# Patient Record
Sex: FEMALE | Race: WHITE | NOT HISPANIC OR LATINO | Employment: PART TIME | URBAN - METROPOLITAN AREA
[De-identification: names, ages, dates, MRNs, and addresses within clinical notes are randomized per-mention and may not be internally consistent; named-entity substitution may affect disease eponyms.]

---

## 2017-11-01 ENCOUNTER — APPOINTMENT (OUTPATIENT)
Dept: CT IMAGING | Facility: HOSPITAL | Age: 37
End: 2017-11-01

## 2017-11-01 ENCOUNTER — HOSPITAL ENCOUNTER (EMERGENCY)
Facility: HOSPITAL | Age: 37
Discharge: HOME | End: 2017-11-01
Attending: FAMILY MEDICINE | Admitting: FAMILY MEDICINE

## 2017-11-01 VITALS
RESPIRATION RATE: 18 BRPM | SYSTOLIC BLOOD PRESSURE: 96 MMHG | DIASTOLIC BLOOD PRESSURE: 66 MMHG | BODY MASS INDEX: 33.32 KG/M2 | HEART RATE: 70 BPM | WEIGHT: 200 LBS | HEIGHT: 65 IN | OXYGEN SATURATION: 95 % | TEMPERATURE: 98.1 F

## 2017-11-01 DIAGNOSIS — R10.2 CHRONIC PELVIC PAIN IN FEMALE: Primary | ICD-10-CM

## 2017-11-01 DIAGNOSIS — G89.29 CHRONIC PELVIC PAIN IN FEMALE: Primary | ICD-10-CM

## 2017-11-01 LAB
ALBUMIN SERPL-MCNC: 4.1 G/DL (ref 3.5–5.2)
ALBUMIN/GLOB SERPL: 1.4 G/DL
ALP SERPL-CCNC: 163 U/L (ref 39–117)
ALT SERPL W P-5'-P-CCNC: 214 U/L (ref 1–33)
ANION GAP SERPL CALCULATED.3IONS-SCNC: 14.4 MMOL/L
AST SERPL-CCNC: 277 U/L (ref 1–32)
BASOPHILS # BLD AUTO: 0.02 10*3/MM3 (ref 0–0.2)
BASOPHILS NFR BLD AUTO: 0.4 % (ref 0–1.5)
BILIRUB SERPL-MCNC: 0.3 MG/DL (ref 0.1–1.2)
BILIRUB UR QL STRIP: NEGATIVE
BUN BLD-MCNC: 10 MG/DL (ref 6–20)
BUN/CREAT SERPL: 15.9 (ref 7–25)
CALCIUM SPEC-SCNC: 9.4 MG/DL (ref 8.6–10.5)
CHLORIDE SERPL-SCNC: 101 MMOL/L (ref 98–107)
CLARITY UR: ABNORMAL
CO2 SERPL-SCNC: 21.6 MMOL/L (ref 22–29)
COLOR UR: YELLOW
CREAT BLD-MCNC: 0.63 MG/DL (ref 0.57–1)
DEPRECATED RDW RBC AUTO: 44 FL (ref 37–54)
EOSINOPHIL # BLD AUTO: 0.04 10*3/MM3 (ref 0–0.7)
EOSINOPHIL NFR BLD AUTO: 0.8 % (ref 0.3–6.2)
ERYTHROCYTE [DISTWIDTH] IN BLOOD BY AUTOMATED COUNT: 13.4 % (ref 11.7–13)
GFR SERPL CREATININE-BSD FRML MDRD: 108 ML/MIN/1.73
GLOBULIN UR ELPH-MCNC: 3 GM/DL
GLUCOSE BLD-MCNC: 118 MG/DL (ref 65–99)
GLUCOSE UR STRIP-MCNC: NEGATIVE MG/DL
HCT VFR BLD AUTO: 41.6 % (ref 35.6–45.5)
HGB BLD-MCNC: 13.7 G/DL (ref 11.9–15.5)
HGB UR QL STRIP.AUTO: NEGATIVE
IMM GRANULOCYTES # BLD: 0 10*3/MM3 (ref 0–0.03)
IMM GRANULOCYTES NFR BLD: 0 % (ref 0–0.5)
KETONES UR QL STRIP: NEGATIVE
KOH PREP NAIL: NORMAL
LEUKOCYTE ESTERASE UR QL STRIP.AUTO: NEGATIVE
LYMPHOCYTES # BLD AUTO: 1.38 10*3/MM3 (ref 0.9–4.8)
LYMPHOCYTES NFR BLD AUTO: 27.8 % (ref 19.6–45.3)
MCH RBC QN AUTO: 30 PG (ref 26.9–32)
MCHC RBC AUTO-ENTMCNC: 32.9 G/DL (ref 32.4–36.3)
MCV RBC AUTO: 91 FL (ref 80.5–98.2)
MONOCYTES # BLD AUTO: 0.51 10*3/MM3 (ref 0.2–1.2)
MONOCYTES NFR BLD AUTO: 10.3 % (ref 5–12)
NEUTROPHILS # BLD AUTO: 3.02 10*3/MM3 (ref 1.9–8.1)
NEUTROPHILS NFR BLD AUTO: 60.7 % (ref 42.7–76)
NITRITE UR QL STRIP: NEGATIVE
PH UR STRIP.AUTO: 7 [PH] (ref 5–8)
PLATELET # BLD AUTO: 235 10*3/MM3 (ref 140–500)
PMV BLD AUTO: 10 FL (ref 6–12)
POTASSIUM BLD-SCNC: 3.9 MMOL/L (ref 3.5–5.2)
PROT SERPL-MCNC: 7.1 G/DL (ref 6–8.5)
PROT UR QL STRIP: NEGATIVE
RBC # BLD AUTO: 4.57 10*6/MM3 (ref 3.9–5.2)
SODIUM BLD-SCNC: 137 MMOL/L (ref 136–145)
SP GR UR STRIP: 1.02 (ref 1–1.03)
UROBILINOGEN UR QL STRIP: ABNORMAL
WBC NRBC COR # BLD: 4.97 10*3/MM3 (ref 4.5–10.7)

## 2017-11-01 PROCEDURE — 81003 URINALYSIS AUTO W/O SCOPE: CPT | Performed by: PHYSICIAN ASSISTANT

## 2017-11-01 PROCEDURE — 96375 TX/PRO/DX INJ NEW DRUG ADDON: CPT

## 2017-11-01 PROCEDURE — 96376 TX/PRO/DX INJ SAME DRUG ADON: CPT

## 2017-11-01 PROCEDURE — 96361 HYDRATE IV INFUSION ADD-ON: CPT

## 2017-11-01 PROCEDURE — 25010000002 PROMETHAZINE PER 50 MG: Performed by: PHYSICIAN ASSISTANT

## 2017-11-01 PROCEDURE — 25010000002 HYDROMORPHONE PER 4 MG: Performed by: FAMILY MEDICINE

## 2017-11-01 PROCEDURE — 87591 N.GONORRHOEAE DNA AMP PROB: CPT | Performed by: FAMILY MEDICINE

## 2017-11-01 PROCEDURE — 74177 CT ABD & PELVIS W/CONTRAST: CPT

## 2017-11-01 PROCEDURE — 25010000002 KETOROLAC TROMETHAMINE PER 15 MG: Performed by: PHYSICIAN ASSISTANT

## 2017-11-01 PROCEDURE — 25010000002 PROMETHAZINE PER 50 MG: Performed by: FAMILY MEDICINE

## 2017-11-01 PROCEDURE — 87220 TISSUE EXAM FOR FUNGI: CPT | Performed by: FAMILY MEDICINE

## 2017-11-01 PROCEDURE — 0 IOPAMIDOL 61 % SOLUTION: Performed by: FAMILY MEDICINE

## 2017-11-01 PROCEDURE — 85025 COMPLETE CBC W/AUTO DIFF WBC: CPT | Performed by: FAMILY MEDICINE

## 2017-11-01 PROCEDURE — 80053 COMPREHEN METABOLIC PANEL: CPT | Performed by: FAMILY MEDICINE

## 2017-11-01 PROCEDURE — 99284 EMERGENCY DEPT VISIT MOD MDM: CPT

## 2017-11-01 PROCEDURE — 96374 THER/PROPH/DIAG INJ IV PUSH: CPT

## 2017-11-01 PROCEDURE — 87491 CHLMYD TRACH DNA AMP PROBE: CPT | Performed by: FAMILY MEDICINE

## 2017-11-01 RX ORDER — HYDROMORPHONE HYDROCHLORIDE 1 MG/ML
0.5 INJECTION, SOLUTION INTRAMUSCULAR; INTRAVENOUS; SUBCUTANEOUS ONCE
Status: COMPLETED | OUTPATIENT
Start: 2017-11-01 | End: 2017-11-01

## 2017-11-01 RX ORDER — PROMETHAZINE HYDROCHLORIDE 25 MG/ML
12.5 INJECTION, SOLUTION INTRAMUSCULAR; INTRAVENOUS ONCE
Status: COMPLETED | OUTPATIENT
Start: 2017-11-01 | End: 2017-11-01

## 2017-11-01 RX ORDER — SODIUM CHLORIDE 0.9 % (FLUSH) 0.9 %
10 SYRINGE (ML) INJECTION AS NEEDED
Status: DISCONTINUED | OUTPATIENT
Start: 2017-11-01 | End: 2017-11-01 | Stop reason: HOSPADM

## 2017-11-01 RX ORDER — ESTRADIOL 1 MG/1
1 TABLET ORAL DAILY
COMMUNITY
End: 2018-11-20

## 2017-11-01 RX ORDER — KETOROLAC TROMETHAMINE 15 MG/ML
10 INJECTION, SOLUTION INTRAMUSCULAR; INTRAVENOUS ONCE
Status: COMPLETED | OUTPATIENT
Start: 2017-11-01 | End: 2017-11-01

## 2017-11-01 RX ADMIN — PROMETHAZINE HYDROCHLORIDE 12.5 MG: 25 INJECTION INTRAMUSCULAR; INTRAVENOUS at 19:33

## 2017-11-01 RX ADMIN — HYDROMORPHONE HYDROCHLORIDE 0.5 MG: 1 INJECTION, SOLUTION INTRAMUSCULAR; INTRAVENOUS; SUBCUTANEOUS at 15:41

## 2017-11-01 RX ADMIN — SODIUM CHLORIDE 1000 ML: 9 INJECTION, SOLUTION INTRAVENOUS at 15:36

## 2017-11-01 RX ADMIN — HYDROMORPHONE HYDROCHLORIDE 0.5 MG: 1 INJECTION, SOLUTION INTRAMUSCULAR; INTRAVENOUS; SUBCUTANEOUS at 16:56

## 2017-11-01 RX ADMIN — KETOROLAC TROMETHAMINE 10 MG: 15 INJECTION, SOLUTION INTRAMUSCULAR; INTRAVENOUS at 19:36

## 2017-11-01 RX ADMIN — IOPAMIDOL 85 ML: 612 INJECTION, SOLUTION INTRAVENOUS at 18:39

## 2017-11-01 RX ADMIN — PROMETHAZINE HYDROCHLORIDE 12.5 MG: 25 INJECTION INTRAMUSCULAR; INTRAVENOUS at 15:37

## 2017-11-01 NOTE — ED PROVIDER NOTES
" EMERGENCY DEPARTMENT ENCOUNTER    CHIEF COMPLAINT  Chief Complaint: pelvic pain  History given by: patient, family  History limited by: N/A  Room Number:   PMD: Briana Armijo, ALEX  OBGYN- Dr Santoro     HPI:  Pt has hx of PCOS. She states that she underwent complete hysterectomy with BSO by Dr Santoro in 2017. For the last 3 months, she has had pelvic pain and intermittent vaginal bleeding (spotting). She notes that she was seen by Dr Santoro for these sx about 2 days ago and was advised to consider having her \"cervix removed\".  However, for the last 2 days, she has had increased pelvic pain, vaginal pain, clear vaginal discharge, nausea, and vomiting. She denies upper abd pain, diarrhea, fevers, chills, chest pain, trouble breathing, weakness, dizziness, and pain and difficulty with urination. Pt notes that she has been using \"vaginal valium\" prescribed by her OBGYN for her pelvic pain with temporary relief. Pt has no other complaints at this time.     Pain Location: pelvis  Radiation: none  Quality: pain  Intensity/Severity: moderate  Duration: started about 3 months ago  Onset quality: gradual  Timing: intermittent  Progression: worsening  Aggravating Factors: none  Alleviating Factors: none  Previous Episodes: none  Treatment before arrival: Pt notes that she has been using \"vaginal valium\" prescribed by her OBGYN for her pelvic pain with temporary relief.    Associated Symptoms: vaginal bleeding (spotting), vaginal pain, clear vaginal discharge, nausea, vomiting       PAST MEDICAL HISTORY  Active Ambulatory Problems     Diagnosis Date Noted   • No Active Ambulatory Problems     Resolved Ambulatory Problems     Diagnosis Date Noted   • No Resolved Ambulatory Problems     Past Medical History:   Diagnosis Date   • PCOS (polycystic ovarian syndrome)          PAST SURGICAL HISTORY  Past Surgical History:   Procedure Laterality Date   •  SECTION     • CHOLECYSTECTOMY           FAMILY " HISTORY  History reviewed. No pertinent family history.      SOCIAL HISTORY  Social History     Social History   • Marital status:      Spouse name: N/A   • Number of children: N/A   • Years of education: N/A     Occupational History   • Not on file.     Social History Main Topics   • Smoking status: Never Smoker   • Smokeless tobacco: Not on file   • Alcohol use No   • Drug use: No   • Sexual activity: Not on file     Other Topics Concern   • Not on file     Social History Narrative         ALLERGIES  Amoxicillin; Ceclor [cefaclor]; Codeine; Eggs or egg-derived products; Penicillins; Rocephin [ceftriaxone]; and Sulfa antibiotics        REVIEW OF SYSTEMS  Review of Systems   Constitutional: Negative for chills and fever.   HENT: Negative for congestion, rhinorrhea and sore throat.    Eyes: Negative for pain.   Respiratory: Negative for cough and shortness of breath.    Cardiovascular: Negative for chest pain and palpitations.   Gastrointestinal: Positive for abdominal pain (pelvic pain), nausea and vomiting. Negative for diarrhea.   Endocrine: Negative.    Genitourinary: Positive for pelvic pain, vaginal bleeding (vaginal spotting), vaginal discharge (clear vaginal discharge) and vaginal pain. Negative for difficulty urinating.   Musculoskeletal: Negative for myalgias.   Skin: Negative.    Neurological: Negative for speech difficulty, weakness, numbness and headaches.   Psychiatric/Behavioral: Negative.    All other systems reviewed and are negative.           PHYSICAL EXAM  ED Triage Vitals   Temp Heart Rate Resp BP SpO2   11/01/17 1455 11/01/17 1455 11/01/17 1455 11/01/17 1505 11/01/17 1455   98.1 °F (36.7 °C) 112 15 137/80 97 % WNL      Temp src Heart Rate Source Patient Position BP Location FiO2 (%)   11/01/17 1455 11/01/17 1455 11/01/17 1505 11/01/17 1505 --   Tympanic Monitor Lying Right arm        Physical Exam   Constitutional: She is oriented to person, place, and time. No distress.   HENT:   Head:  Normocephalic.   Mouth/Throat: Mucous membranes are normal.   Eyes: EOM are normal. Pupils are equal, round, and reactive to light.   Neck: Normal range of motion. Neck supple.   Cardiovascular: Normal rate, regular rhythm and normal heart sounds.    Pulmonary/Chest: Effort normal and breath sounds normal. No respiratory distress. She has no decreased breath sounds. She has no wheezes. She has no rhonchi. She has no rales.   Abdominal: Soft. There is no tenderness. There is no rebound and no guarding.   Genitourinary:   Genitourinary Comments: normal external genitalia, no vaginal bleeding, no cervical motion tenderness, and scant white vaginal discharge consistent with vaginal valium tablet    Performed pelvic exam with RN at pt's bedside    Musculoskeletal: Normal range of motion.   Neurological: She is alert and oriented to person, place, and time. She has normal motor skills and normal sensation.   Skin: Skin is warm and dry.   Psychiatric: Mood and affect normal.   Nursing note and vitals reviewed.          LAB RESULTS  Recent Results (from the past 24 hour(s))   Comprehensive Metabolic Panel    Collection Time: 11/01/17  3:33 PM   Result Value Ref Range    Glucose 118 (H) 65 - 99 mg/dL    BUN 10 6 - 20 mg/dL    Creatinine 0.63 0.57 - 1.00 mg/dL    Sodium 137 136 - 145 mmol/L    Potassium 3.9 3.5 - 5.2 mmol/L    Chloride 101 98 - 107 mmol/L    CO2 21.6 (L) 22.0 - 29.0 mmol/L    Calcium 9.4 8.6 - 10.5 mg/dL    Total Protein 7.1 6.0 - 8.5 g/dL    Albumin 4.10 3.50 - 5.20 g/dL    ALT (SGPT) 214 (H) 1 - 33 U/L    AST (SGOT) 277 (H) 1 - 32 U/L    Alkaline Phosphatase 163 (H) 39 - 117 U/L    Total Bilirubin 0.3 0.1 - 1.2 mg/dL    eGFR Non African Amer 108 >60 mL/min/1.73    Globulin 3.0 gm/dL    A/G Ratio 1.4 g/dL    BUN/Creatinine Ratio 15.9 7.0 - 25.0    Anion Gap 14.4 mmol/L   CBC Auto Differential    Collection Time: 11/01/17  3:33 PM   Result Value Ref Range    WBC 4.97 4.50 - 10.70 10*3/mm3    RBC 4.57 3.90 -  5.20 10*6/mm3    Hemoglobin 13.7 11.9 - 15.5 g/dL    Hematocrit 41.6 35.6 - 45.5 %    MCV 91.0 80.5 - 98.2 fL    MCH 30.0 26.9 - 32.0 pg    MCHC 32.9 32.4 - 36.3 g/dL    RDW 13.4 (H) 11.7 - 13.0 %    RDW-SD 44.0 37.0 - 54.0 fl    MPV 10.0 6.0 - 12.0 fL    Platelets 235 140 - 500 10*3/mm3    Neutrophil % 60.7 42.7 - 76.0 %    Lymphocyte % 27.8 19.6 - 45.3 %    Monocyte % 10.3 5.0 - 12.0 %    Eosinophil % 0.8 0.3 - 6.2 %    Basophil % 0.4 0.0 - 1.5 %    Immature Grans % 0.0 0.0 - 0.5 %    Neutrophils, Absolute 3.02 1.90 - 8.10 10*3/mm3    Lymphocytes, Absolute 1.38 0.90 - 4.80 10*3/mm3    Monocytes, Absolute 0.51 0.20 - 1.20 10*3/mm3    Eosinophils, Absolute 0.04 0.00 - 0.70 10*3/mm3    Basophils, Absolute 0.02 0.00 - 0.20 10*3/mm3    Immature Grans, Absolute 0.00 0.00 - 0.03 10*3/mm3       Ordered the above labs and reviewed the results.        RADIOLOGY  CT Abdomen Pelvis With Contrast    (Results Pending)      CT abd/pel (independently viewed by me, interpreted by radiologist)- no acute process    Ordered the above noted radiological studies. Reviewed by me in PACS.       PROCEDURES  Procedures        PROGRESS AND CONSULTS  ED Course     3:24 PM- Ordered dilaudid for pain, phenergan for nausea, and IV fluids for hydration. Ordered blood work and UA for further evaluation.     4:46 PM- Per RN, pt still has pain. Ordered repeat dose of dilaudid.     5:30 PM- Performed pelvic exam with RN at pt's bedside-> shows normal external genitalia, no vaginal bleeding, no cervical motion tenderness, and scan white vaginal discharge consistent with vaginal valium tablet.     6:00 PM- Discussed case with Dr Oneill who agrees with the plan of care.     6:14 PM- Ordered CT abd/pel for further evaluation.     7:26 PM- Ordered phenergan for nausea and toradol for pain prior to discharge.     7:30 PM- Rechecked pt. She is resting comfortably and is in no acute distress. Pt has had no vomiting in ED. Discussed with pt about all  pertinent results including stable labs and about CT abd/pel findings (no acute process). Instructed to f/u with PMD and referred OBGYN for recheck and for further management. RTER warnings given. Pt understands and agrees with plan. Addressed all questions.        MEDICAL DECISION MAKING  Results were reviewed/discussed with the patient.     MDM  Number of Diagnoses or Management Options  Chronic pelvic pain in female:      Amount and/or Complexity of Data Reviewed  Clinical lab tests: ordered and reviewed (UA, WBC= 4.97, BUN= 10, creatinine= 0.63)  Tests in the radiology section of CPT®: reviewed and ordered (CT abd/pel- no acute process)  Decide to obtain previous medical records or to obtain history from someone other than the patient: yes  Review and summarize past medical records: yes (Pt underwent hysterectomy by Dr Santoro in 4/2017 at [facility] [place]. She has hx of chronic pelvic pain. )  Independent visualization of images, tracings, or specimens: yes    Patient Progress  Patient progress: stable             DIAGNOSIS  Final diagnoses:   Chronic pelvic pain in female         DISPOSITION  Discharged    DISCHARGE    Patient discharged in stable condition.    Reviewed implications of results, diagnosis, meds, responsibility to follow up, warning signs and symptoms of possible worsening, potential complications and reasons to return to ER.    Patient voiced understanding of above instructions.    Discussed plan for discharge, as there is no emergent indication for admission.  Pt is agreeable and understands need for follow up and repeat testing.  Pt is aware that discharge does not mean that nothing is wrong but it indicates no emergency is present and they must continue care with follow-up as given below or physician of their choice.     FOLLOW-UP  Briana Armijo, APRN  [address]  [facility] [id number] [phone]    Schedule an appointment as soon as possible for a visit      Elena Colin  APRN  [address] [facility] [address]  [phone]    Schedule an appointment as soon as possible for a visit          Latest Documented Vital Signs:  As of 8:04 PM  BP- 104/68 HR- 70 Temp- 98.1 °F (36.7 °C) (Tympanic) O2 sat- 97%        --  Documentation assistance provided by jose Contreras for JOSIE Hammond.  Information recorded by the scribe was done at my direction and has been verified and validated by me.         Lamont Contreras  11/01/17 2008       JOSIE Reynoso  11/01/17 2043

## 2017-11-01 NOTE — ED NOTES
"Pt states she has had pelvic pain and spotting x3 months. Her OB (Dr. Santoro) told her she needed to have her \"cervix removed\". Pt had a hysterectomy at the end of April. Pt states pain is a 9/10.      Adenike Nelson RN  11/01/17 6165    "

## 2017-11-01 NOTE — ED NOTES
Pelvic cart at bedside and patient is ready for exam, PAHeydiC aware.      Edilberto Gupta  11/01/17 3078

## 2017-11-01 NOTE — ED PROVIDER NOTES
Pt presents to the ED c/o pelvic pain (chronic) and that it is more severe today. Pt has had nausea and vomiting due to the pain and cramping. Pt had hysterectomy in 04/2017 and takes estradiol hormone treatment. Pt had a pelvic exam earlier this week by her OBGYN in [place], [place] and was told that the lining of a cervix is shedding. On exam,  RRR, CTAB, abd diffusely soft w/o tenderness. Pt has had two doses of dilaudid pta to the room.     Attestation:  I supervised care provided by the midlevel provider.    We have discussed this patient's history, physical exam, and treatment plan.   I have reviewed the note and personally saw and examined the patient and agree with the plan of care.    Documentation assistance provided by jose Hernandez for Dr. Oneill. Information recorded by the scribe was done at my direction and has been verified and validated by me.       Krys Hernandez  11/01/17 4078       Henok Oneill MD  11/01/17 2664

## 2017-11-01 NOTE — ED NOTES
PT updated on the need for urine still not able to give at this time     Ninoska Wilson, RN  11/01/17 2787

## 2017-11-01 NOTE — PROGRESS NOTES
Clinical Pharmacy Services: Medication History    Evangelina Mtz is a 37 y.o. female presenting to [facility] for There are no admission diagnoses documented for this encounter.    She  has a past medical history of PCOS (polycystic ovarian syndrome).    Allergies as of 11/01/2017 - Helder as Reviewed 11/01/2017   Allergen Reaction Noted   • Amoxicillin  06/20/2016   • Ceclor [cefaclor]  06/20/2016   • Codeine  06/20/2016   • Eggs or egg-derived products  06/20/2016   • Penicillins  06/20/2016   • Rocephin [ceftriaxone]  06/20/2016   • Sulfa antibiotics  11/01/2017       Medication information was obtained from: patient  Pharmacy and Phone Number: none provided    Prior to Admission Medications       Prescriptions Last Dose Informant Patient Reported? Taking?      estradiol (ESTRACE) 1 MG tablet 11/1/2017 Self Yes Yes    Take 1 mg by mouth Daily.    sertraline (ZOLOFT) 50 MG tablet 10/31/2017 Self Yes Yes    Take 50 mg by mouth Every Night.    Prenatal Vit-Fe Fumarate-FA (PRENATAL, CLASSIC, VITAMIN) 28-0.8 MG tablet tablet  Self Yes No    Take  by mouth daily.                Medication notes: Patient states she took 1 of her friend's valium 5mg this am. Doesn't take any OTC products.    This medication list is complete to the best of my knowledge as of 11/1/2017    Please call RX if questions.    Augustine Minor [facility].  11/1/2017 3:42 PM

## 2017-11-03 LAB
C TRACH RRNA SPEC DONR QL NAA+PROBE: NEGATIVE
N GONORRHOEA DNA SPEC QL NAA+PROBE: NEGATIVE

## 2018-05-09 ENCOUNTER — HOSPITAL ENCOUNTER (OUTPATIENT)
Facility: HOSPITAL | Age: 38
Setting detail: OBSERVATION
Discharge: HOME | End: 2018-05-10
Attending: EMERGENCY MEDICINE | Admitting: INTERNAL MEDICINE

## 2018-05-09 DIAGNOSIS — R11.2 INTRACTABLE VOMITING WITH NAUSEA, UNSPECIFIED VOMITING TYPE: Primary | ICD-10-CM

## 2018-05-09 PROBLEM — R34 DECREASED URINATION: Status: ACTIVE | Noted: 2018-05-09

## 2018-05-09 PROBLEM — J11.1 INFLUENZA: Status: ACTIVE | Noted: 2018-05-09

## 2018-05-09 PROBLEM — R10.9 ABDOMINAL PAIN: Status: ACTIVE | Noted: 2018-05-09

## 2018-05-09 PROBLEM — R79.89 LFT ELEVATION: Status: ACTIVE | Noted: 2018-05-09

## 2018-05-09 LAB
ALBUMIN SERPL-MCNC: 4.5 G/DL (ref 3.5–5.2)
ALBUMIN/GLOB SERPL: 1.4 G/DL
ALP SERPL-CCNC: 136 U/L (ref 39–117)
ALT SERPL W P-5'-P-CCNC: 186 U/L (ref 1–33)
ANION GAP SERPL CALCULATED.3IONS-SCNC: 13.7 MMOL/L
AST SERPL-CCNC: 117 U/L (ref 1–32)
BASOPHILS # BLD AUTO: 0.04 10*3/MM3 (ref 0–0.2)
BASOPHILS NFR BLD AUTO: 0.6 % (ref 0–1.5)
BILIRUB SERPL-MCNC: 0.3 MG/DL (ref 0.1–1.2)
BUN BLD-MCNC: 9 MG/DL (ref 6–20)
BUN/CREAT SERPL: 13.6 (ref 7–25)
CALCIUM SPEC-SCNC: 9.7 MG/DL (ref 8.6–10.5)
CHLORIDE SERPL-SCNC: 106 MMOL/L (ref 98–107)
CO2 SERPL-SCNC: 21.3 MMOL/L (ref 22–29)
CREAT BLD-MCNC: 0.66 MG/DL (ref 0.57–1)
DEPRECATED RDW RBC AUTO: 44.6 FL (ref 37–54)
EOSINOPHIL # BLD AUTO: 0.13 10*3/MM3 (ref 0–0.7)
EOSINOPHIL NFR BLD AUTO: 2 % (ref 0.3–6.2)
ERYTHROCYTE [DISTWIDTH] IN BLOOD BY AUTOMATED COUNT: 13.3 % (ref 11.7–13)
GFR SERPL CREATININE-BSD FRML MDRD: 101 ML/MIN/1.73
GLOBULIN UR ELPH-MCNC: 3.2 GM/DL
GLUCOSE BLD-MCNC: 115 MG/DL (ref 65–99)
HCG SERPL QL: NEGATIVE
HCT VFR BLD AUTO: 47.3 % (ref 35.6–45.5)
HGB BLD-MCNC: 15.7 G/DL (ref 11.9–15.5)
HOLD SPECIMEN: NORMAL
IMM GRANULOCYTES # BLD: 0 10*3/MM3 (ref 0–0.03)
IMM GRANULOCYTES NFR BLD: 0 % (ref 0–0.5)
LIPASE SERPL-CCNC: 28 U/L (ref 13–60)
LYMPHOCYTES # BLD AUTO: 1.71 10*3/MM3 (ref 0.9–4.8)
LYMPHOCYTES NFR BLD AUTO: 26.6 % (ref 19.6–45.3)
MCH RBC QN AUTO: 30.5 PG (ref 26.9–32)
MCHC RBC AUTO-ENTMCNC: 33.2 G/DL (ref 32.4–36.3)
MCV RBC AUTO: 92 FL (ref 80.5–98.2)
MONOCYTES # BLD AUTO: 0.47 10*3/MM3 (ref 0.2–1.2)
MONOCYTES NFR BLD AUTO: 7.3 % (ref 5–12)
NEUTROPHILS # BLD AUTO: 4.08 10*3/MM3 (ref 1.9–8.1)
NEUTROPHILS NFR BLD AUTO: 63.5 % (ref 42.7–76)
PLATELET # BLD AUTO: 276 10*3/MM3 (ref 140–500)
PMV BLD AUTO: 9.9 FL (ref 6–12)
POTASSIUM BLD-SCNC: 4.1 MMOL/L (ref 3.5–5.2)
PROT SERPL-MCNC: 7.7 G/DL (ref 6–8.5)
RBC # BLD AUTO: 5.14 10*6/MM3 (ref 3.9–5.2)
SODIUM BLD-SCNC: 141 MMOL/L (ref 136–145)
WBC NRBC COR # BLD: 6.43 10*3/MM3 (ref 4.5–10.7)
WHOLE BLOOD HOLD SPECIMEN: NORMAL
WHOLE BLOOD HOLD SPECIMEN: NORMAL

## 2018-05-09 PROCEDURE — 85025 COMPLETE CBC W/AUTO DIFF WBC: CPT

## 2018-05-09 PROCEDURE — 96374 THER/PROPH/DIAG INJ IV PUSH: CPT

## 2018-05-09 PROCEDURE — 83690 ASSAY OF LIPASE: CPT | Performed by: EMERGENCY MEDICINE

## 2018-05-09 PROCEDURE — 96372 THER/PROPH/DIAG INJ SC/IM: CPT

## 2018-05-09 PROCEDURE — 36415 COLL VENOUS BLD VENIPUNCTURE: CPT

## 2018-05-09 PROCEDURE — G0378 HOSPITAL OBSERVATION PER HR: HCPCS

## 2018-05-09 PROCEDURE — 84703 CHORIONIC GONADOTROPIN ASSAY: CPT

## 2018-05-09 PROCEDURE — 96375 TX/PRO/DX INJ NEW DRUG ADDON: CPT

## 2018-05-09 PROCEDURE — 96376 TX/PRO/DX INJ SAME DRUG ADON: CPT

## 2018-05-09 PROCEDURE — 96361 HYDRATE IV INFUSION ADD-ON: CPT

## 2018-05-09 PROCEDURE — 25010000002 DICYCLOMINE PER 20 MG: Performed by: EMERGENCY MEDICINE

## 2018-05-09 PROCEDURE — 80053 COMPREHEN METABOLIC PANEL: CPT | Performed by: EMERGENCY MEDICINE

## 2018-05-09 PROCEDURE — 99284 EMERGENCY DEPT VISIT MOD MDM: CPT

## 2018-05-09 PROCEDURE — 25010000002 ONDANSETRON PER 1 MG: Performed by: EMERGENCY MEDICINE

## 2018-05-09 PROCEDURE — 25010000002 PROMETHAZINE PER 50 MG: Performed by: EMERGENCY MEDICINE

## 2018-05-09 RX ORDER — PROMETHAZINE HYDROCHLORIDE 25 MG/ML
12.5 INJECTION, SOLUTION INTRAMUSCULAR; INTRAVENOUS ONCE
Status: COMPLETED | OUTPATIENT
Start: 2018-05-09 | End: 2018-05-09

## 2018-05-09 RX ORDER — ARIPIPRAZOLE 2 MG/1
2 TABLET ORAL DAILY
COMMUNITY
Start: 2018-01-23 | End: 2018-11-20

## 2018-05-09 RX ORDER — ONDANSETRON 4 MG/1
4 TABLET, FILM COATED ORAL EVERY 6 HOURS PRN
Status: DISCONTINUED | OUTPATIENT
Start: 2018-05-09 | End: 2018-05-10 | Stop reason: HOSPADM

## 2018-05-09 RX ORDER — SODIUM CHLORIDE 0.9 % (FLUSH) 0.9 %
1-10 SYRINGE (ML) INJECTION AS NEEDED
Status: DISCONTINUED | OUTPATIENT
Start: 2018-05-09 | End: 2018-05-10 | Stop reason: HOSPADM

## 2018-05-09 RX ORDER — ZOLPIDEM TARTRATE 10 MG/1
10 TABLET ORAL NIGHTLY PRN
Status: ON HOLD | COMMUNITY
Start: 2017-12-07 | End: 2018-05-10

## 2018-05-09 RX ORDER — OSELTAMIVIR PHOSPHATE 75 MG/1
75 CAPSULE ORAL 2 TIMES DAILY
Status: DISCONTINUED | OUTPATIENT
Start: 2018-05-09 | End: 2018-05-10 | Stop reason: HOSPADM

## 2018-05-09 RX ORDER — ONDANSETRON 4 MG/1
4 TABLET, ORALLY DISINTEGRATING ORAL EVERY 6 HOURS PRN
Status: DISCONTINUED | OUTPATIENT
Start: 2018-05-09 | End: 2018-05-10 | Stop reason: HOSPADM

## 2018-05-09 RX ORDER — SODIUM CHLORIDE 9 MG/ML
150 INJECTION, SOLUTION INTRAVENOUS CONTINUOUS
Status: DISCONTINUED | OUTPATIENT
Start: 2018-05-09 | End: 2018-05-10 | Stop reason: HOSPADM

## 2018-05-09 RX ORDER — DICYCLOMINE HYDROCHLORIDE 10 MG/1
10 CAPSULE ORAL 4 TIMES DAILY PRN
Status: DISCONTINUED | OUTPATIENT
Start: 2018-05-09 | End: 2018-05-10 | Stop reason: HOSPADM

## 2018-05-09 RX ORDER — NITROGLYCERIN 0.4 MG/1
0.4 TABLET SUBLINGUAL
Status: DISCONTINUED | OUTPATIENT
Start: 2018-05-09 | End: 2018-05-10 | Stop reason: HOSPADM

## 2018-05-09 RX ORDER — OSELTAMIVIR PHOSPHATE 75 MG/1
75 CAPSULE ORAL 2 TIMES DAILY
COMMUNITY
End: 2018-11-20

## 2018-05-09 RX ORDER — ONDANSETRON 2 MG/ML
4 INJECTION INTRAMUSCULAR; INTRAVENOUS ONCE
Status: COMPLETED | OUTPATIENT
Start: 2018-05-09 | End: 2018-05-09

## 2018-05-09 RX ORDER — DICYCLOMINE HYDROCHLORIDE 10 MG/ML
20 INJECTION INTRAMUSCULAR ONCE
Status: COMPLETED | OUTPATIENT
Start: 2018-05-09 | End: 2018-05-09

## 2018-05-09 RX ORDER — ACETAMINOPHEN 325 MG/1
650 TABLET ORAL EVERY 4 HOURS PRN
Status: DISCONTINUED | OUTPATIENT
Start: 2018-05-09 | End: 2018-05-10 | Stop reason: HOSPADM

## 2018-05-09 RX ORDER — SODIUM CHLORIDE 0.9 % (FLUSH) 0.9 %
10 SYRINGE (ML) INJECTION AS NEEDED
Status: DISCONTINUED | OUTPATIENT
Start: 2018-05-09 | End: 2018-05-10 | Stop reason: HOSPADM

## 2018-05-09 RX ORDER — ONDANSETRON 2 MG/ML
4 INJECTION INTRAMUSCULAR; INTRAVENOUS EVERY 6 HOURS PRN
Status: DISCONTINUED | OUTPATIENT
Start: 2018-05-09 | End: 2018-05-10 | Stop reason: HOSPADM

## 2018-05-09 RX ORDER — LAMOTRIGINE 25 MG/1
25 TABLET ORAL DAILY
COMMUNITY
End: 2018-11-20

## 2018-05-09 RX ADMIN — DICYCLOMINE HYDROCHLORIDE 20 MG: 20 INJECTION, SOLUTION INTRAMUSCULAR at 18:45

## 2018-05-09 RX ADMIN — SODIUM CHLORIDE 1000 ML: 9 INJECTION, SOLUTION INTRAVENOUS at 19:43

## 2018-05-09 RX ADMIN — PROMETHAZINE HYDROCHLORIDE 12.5 MG: 25 INJECTION INTRAMUSCULAR; INTRAVENOUS at 18:05

## 2018-05-09 RX ADMIN — ONDANSETRON 4 MG: 2 INJECTION INTRAMUSCULAR; INTRAVENOUS at 21:39

## 2018-05-09 RX ADMIN — ONDANSETRON 4 MG: 2 INJECTION INTRAMUSCULAR; INTRAVENOUS at 20:04

## 2018-05-09 RX ADMIN — PROMETHAZINE HYDROCHLORIDE 12.5 MG: 25 INJECTION INTRAMUSCULAR; INTRAVENOUS at 19:13

## 2018-05-09 RX ADMIN — SODIUM CHLORIDE 1000 ML: 9 INJECTION, SOLUTION INTRAVENOUS at 17:34

## 2018-05-09 RX ADMIN — SODIUM CHLORIDE 100 ML/HR: 9 INJECTION, SOLUTION INTRAVENOUS at 23:50

## 2018-05-09 NOTE — ED TRIAGE NOTES
PT complains of nausea and vomiting x4 days. Tested positive for Flu Brandt Connellyck on Monday.  Patient states she hasn't been able to urinate since Sunday.

## 2018-05-09 NOTE — ED PROVIDER NOTES
[facility] EMERGENCY DEPARTMENT ENCOUNTER    CHIEF COMPLAINT  Chief Complaint: decreased urination  History given by: patient, family  History limited by: nothing  [facility] Room Number:   PMD: ALEX Adams      HPI:  Pt is a 38 y.o. female who presents complaining of decreased urination for the last 5 days. Pt states that she has not urinated since 18. Pt states that she has had N/V/D for the last 5 days and has not been able to keep any fluids down secondary to N/V. Pt also complains of a dry cough and congestion and states that she was recently diagnosed with influenza B. Pt states that she recently ran out of Phenergan as well. Pt states that she called her PCP and was instructed to come to the ED for IVF and further evaluation.    Onset: gradual  Duration: 5 days  Severity: moderate  Associated symptoms: N/V/D, cough, congestion  Previous treatment: Pt states that she recently ran out of Phenergan as well.     PAST MEDICAL HISTORY  Active Ambulatory Problems     Diagnosis Date Noted   • No Active Ambulatory Problems     Resolved Ambulatory Problems     Diagnosis Date Noted   • No Resolved Ambulatory Problems     Past Medical History:   Diagnosis Date   • Anxiety    • Chronic pain syndrome    • Depression    • Fatigue    • Fibromyalgia    • Insomnia    • ELIZABETH (obstructive sleep apnea)    • PCOS (polycystic ovarian syndrome)        PAST SURGICAL HISTORY  Past Surgical History:   Procedure Laterality Date   •  SECTION     • CHOLECYSTECTOMY     • HYSTERECTOMY     • TONSILLECTOMY         FAMILY HISTORY  History reviewed. No pertinent family history.    SOCIAL HISTORY  Social History     Social History   • Marital status:      Spouse name: N/A   • Number of children: N/A   • Years of education: N/A     Occupational History   • Not on file.     Social History Main Topics   • Smoking status: Never Smoker   • Smokeless tobacco: Not on file   • Alcohol use No   • Drug use: No   • Sexual  activity: Not on file     Other Topics Concern   • Not on file     Social History Narrative   • No narrative on file       ALLERGIES  Amoxicillin; Ceclor [cefaclor]; Codeine; Eggs or egg-derived products; Penicillins; Rocephin [ceftriaxone]; and Sulfa antibiotics    REVIEW OF SYSTEMS  Review of Systems   Constitutional: Negative for fever.   HENT: Positive for congestion. Negative for sore throat.    Eyes: Negative.    Respiratory: Positive for cough (dry). Negative for shortness of breath.    Cardiovascular: Negative for chest pain.   Gastrointestinal: Positive for diarrhea, nausea and vomiting. Negative for abdominal pain.   Genitourinary: Positive for decreased urine volume. Negative for dysuria.   Musculoskeletal: Negative for neck pain.   Skin: Negative for rash.   Allergic/Immunologic: Negative.    Neurological: Negative for weakness, numbness and headaches.   Hematological: Negative.    Psychiatric/Behavioral: Negative.    All other systems reviewed and are negative.      PHYSICAL EXAM  ED Triage Vitals   Temp Heart Rate Resp BP SpO2   05/09/18 1631 05/09/18 1631 05/09/18 1631 05/09/18 1637 05/09/18 1631   98.9 °F (37.2 °C) (!) 121 18 137/98 99 %      Temp src Heart Rate Source Patient Position BP Location FiO2 (%)   -- -- 05/09/18 1637 05/09/18 1637 --     Sitting Right arm        Physical Exam   Constitutional: She is oriented to person, place, and time. No distress.   HENT:   Head: Normocephalic and atraumatic.   Mouth/Throat: Oropharynx is clear and moist.   Eyes: EOM are normal. Pupils are equal, round, and reactive to light.   Neck: Normal range of motion. Neck supple.   Cardiovascular: Regular rhythm and normal heart sounds.  Tachycardia present.    Pulmonary/Chest: Effort normal and breath sounds normal. No respiratory distress.   Pt is actively coughing   Abdominal: Soft. Bowel sounds are normal. There is tenderness (mild, superficial, lower abdominal tenderness). There is no rebound and no guarding.    Musculoskeletal: Normal range of motion. She exhibits no edema.   Neurological: She is alert and oriented to person, place, and time. She has normal sensation and normal strength.   Skin: Skin is warm and dry. No rash noted.   Psychiatric: Mood and affect normal.   Nursing note and vitals reviewed.      LAB RESULTS  Lab Results (last 24 hours)     Procedure Component Value Units Date/Time    CBC & Differential [[id number]] Collected:  05/09/18 1650    Specimen:  Blood Updated:  05/09/18 1706    Narrative:       The following orders were created for panel order CBC & Differential.  Procedure                               Abnormality         Status                     ---------                               -----------         ------                     CBC Auto Differential[[id number]]        Abnormal            Final result                 Please view results for these tests on the individual orders.    Comprehensive Metabolic Panel [[id number]]  (Abnormal) Collected:  05/09/18 1650    Specimen:  Blood Updated:  05/09/18 1729     Glucose 115 (H) mg/dL      BUN 9 mg/dL      Creatinine 0.66 mg/dL      Sodium 141 mmol/L      Potassium 4.1 mmol/L      Chloride 106 mmol/L      CO2 21.3 (L) mmol/L      Calcium 9.7 mg/dL      Total Protein 7.7 g/dL      Albumin 4.50 g/dL      ALT (SGPT) 186 (H) U/L      AST (SGOT) 117 (H) U/L      Alkaline Phosphatase 136 (H) U/L      Total Bilirubin 0.3 mg/dL      eGFR Non African Amer 101 mL/min/1.73      Globulin 3.2 gm/dL      A/G Ratio 1.4 g/dL      BUN/Creatinine Ratio 13.6     Anion Gap 13.7 mmol/L     Lipase [[id number]]  (Normal) Collected:  05/09/18 1650    Specimen:  Blood Updated:  05/09/18 1729     Lipase 28 U/L     hCG, Serum, Qualitative [[id number]]  (Normal) Collected:  05/09/18 1650    Specimen:  Blood Updated:  05/09/18 1720     HCG Qualitative Negative    CBC Auto Differential [[id number]]  (Abnormal) Collected:  05/09/18 1650    Specimen:  Blood Updated:  05/09/18 1706      WBC 6.43 10*3/mm3      RBC 5.14 10*6/mm3      Hemoglobin 15.7 (H) g/dL      Hematocrit 47.3 (H) %      MCV 92.0 fL      MCH 30.5 pg      MCHC 33.2 g/dL      RDW 13.3 (H) %      RDW-SD 44.6 fl      MPV 9.9 fL      Platelets 276 10*3/mm3      Neutrophil % 63.5 %      Lymphocyte % 26.6 %      Monocyte % 7.3 %      Eosinophil % 2.0 %      Basophil % 0.6 %      Immature Grans % 0.0 %      Neutrophils, Absolute 4.08 10*3/mm3      Lymphocytes, Absolute 1.71 10*3/mm3      Monocytes, Absolute 0.47 10*3/mm3      Eosinophils, Absolute 0.13 10*3/mm3      Basophils, Absolute 0.04 10*3/mm3      Immature Grans, Absolute 0.00 10*3/mm3           I ordered the above labs and reviewed the results    PROCEDURES  Procedures      PROGRESS AND CONSULTS  ED Course     1734- Ordered IVF for hydration.     1750- Notified pt and family of the pt's lab results, including her WBC and elevated LFTs. D/w pt and family that the pt's LFTs are not elevated enough to be caused by hepatitis. Discussed the plan to order IVF and phenergan for nausea. Pt understands and agrees with the plan, all questions answered.    1754- Ordered Phenergan for nausea.    1840- Per RN, the pt is requesting Bentyl for abd cramping. Ordered Bentyl.    1909- Pt called out requesting additional phenergan for nausea. Ordered phenergan for nausea and IVF for hydration.    2000- Per RN, the pt is continuing to dry heave. Ordered zofran for nausea    2040- Rechecked pt. Pt is resting comfortably but continues to state that she has not been able to urinate after almost 2L of IVF. Notified pt that I will admit her for further evaluation of her difficulty urinating. Pt and family agree with the plan and all questions were addressed.    2046- Placed call to [facility] for admission.    2112- Discussed the pt's with Dr. Griffin ([facility]), who agrees to admit the pt to a Med/Surg bed.    MEDICAL DECISION MAKING  Results were reviewed/discussed with the patient and they were also made aware of  online access. Pt also made aware that some labs, such as cultures, will not be resulted during ER visit and follow up with PMD is necessary.     MDM  Number of Diagnoses or Management Options  Intractable vomiting with nausea, unspecified vomiting type:      Amount and/or Complexity of Data Reviewed  Clinical lab tests: ordered and reviewed (Hemoglobin=15.7, LFTs are elevated)  Decide to obtain previous medical records or to obtain history from someone other than the patient: yes  Obtain history from someone other than the patient: yes (father)  Review and summarize past medical records: yes (Pt's LFTs have been elevated since May 2017. Pt was diagnosed with Influenza B on 5/7/18 and discharged home on Tamiflu)  Discuss the patient with other providers: yes (D/w Dr. Griffin ([facility]))    Patient Progress  Patient progress: stable         DIAGNOSIS  Final diagnoses:   Intractable vomiting with nausea, unspecified vomiting type       DISPOSITION  ADMISSION    Discussed treatment plan and reason for admission with pt/family and admitting physician.  Pt/family voiced understanding of the plan for admission for further testing/treatment as needed.         Latest Documented Vital Signs:  As of 9:13 PM  BP- 123/41 HR- 96 Temp- 98 °F (36.7 °C) (Oral) O2 sat- 98%    --  Documentation assistance provided by jose Rios for Dr. Ramírez.  Information recorded by the scribe was done at my direction and has been verified and validated by me.     Agnieszka Rios  05/09/18 5676       Jonathan Ramírez MD  05/09/18 4981

## 2018-05-09 NOTE — ED NOTES
Pt was diagnosed with the flu on Monday. Pt was sent home with Tamiflu. Pt states she started having chills, nausea, vomiting, diarrhea, and generalized weakness on Sunday. Pt states she has not urinated since Sunday night.      Oneida Arana RN  05/09/18 4690

## 2018-05-09 NOTE — ED NOTES
Pt unable to give clean catch urine specimen. MD Aware.      Oneida Arana, STANLEY  05/09/18 1918

## 2018-05-10 VITALS
HEART RATE: 75 BPM | OXYGEN SATURATION: 97 % | DIASTOLIC BLOOD PRESSURE: 82 MMHG | WEIGHT: 208.56 LBS | TEMPERATURE: 97.5 F | HEIGHT: 65 IN | SYSTOLIC BLOOD PRESSURE: 116 MMHG | BODY MASS INDEX: 34.75 KG/M2 | RESPIRATION RATE: 18 BRPM

## 2018-05-10 LAB
ALBUMIN SERPL-MCNC: 3.7 G/DL (ref 3.5–5.2)
ALBUMIN/GLOB SERPL: 1.4 G/DL
ALP SERPL-CCNC: 105 U/L (ref 39–117)
ALT SERPL W P-5'-P-CCNC: 140 U/L (ref 1–33)
AMPHET+METHAMPHET UR QL: NEGATIVE
ANION GAP SERPL CALCULATED.3IONS-SCNC: 11.6 MMOL/L
AST SERPL-CCNC: 90 U/L (ref 1–32)
BARBITURATES UR QL SCN: NEGATIVE
BENZODIAZ UR QL SCN: NEGATIVE
BILIRUB SERPL-MCNC: 0.4 MG/DL (ref 0.1–1.2)
BILIRUB UR QL STRIP: NEGATIVE
BUN BLD-MCNC: 8 MG/DL (ref 6–20)
BUN/CREAT SERPL: 14.3 (ref 7–25)
CALCIUM SPEC-SCNC: 8.8 MG/DL (ref 8.6–10.5)
CANNABINOIDS SERPL QL: NEGATIVE
CHLORIDE SERPL-SCNC: 111 MMOL/L (ref 98–107)
CLARITY UR: ABNORMAL
CO2 SERPL-SCNC: 19.4 MMOL/L (ref 22–29)
COCAINE UR QL: NEGATIVE
COLOR UR: YELLOW
CREAT BLD-MCNC: 0.56 MG/DL (ref 0.57–1)
DEPRECATED RDW RBC AUTO: 45.1 FL (ref 37–54)
ERYTHROCYTE [DISTWIDTH] IN BLOOD BY AUTOMATED COUNT: 13.4 % (ref 11.7–13)
GFR SERPL CREATININE-BSD FRML MDRD: 122 ML/MIN/1.73
GLOBULIN UR ELPH-MCNC: 2.6 GM/DL
GLUCOSE BLD-MCNC: 103 MG/DL (ref 65–99)
GLUCOSE UR STRIP-MCNC: NEGATIVE MG/DL
HCT VFR BLD AUTO: 39.3 % (ref 35.6–45.5)
HGB BLD-MCNC: 12.7 G/DL (ref 11.9–15.5)
HGB UR QL STRIP.AUTO: NEGATIVE
KETONES UR QL STRIP: NEGATIVE
LEUKOCYTE ESTERASE UR QL STRIP.AUTO: NEGATIVE
MCH RBC QN AUTO: 29.7 PG (ref 26.9–32)
MCHC RBC AUTO-ENTMCNC: 32.3 G/DL (ref 32.4–36.3)
MCV RBC AUTO: 92 FL (ref 80.5–98.2)
METHADONE UR QL SCN: NEGATIVE
NITRITE UR QL STRIP: NEGATIVE
OPIATES UR QL: POSITIVE
OXYCODONE UR QL SCN: NEGATIVE
PH UR STRIP.AUTO: 7 [PH] (ref 5–8)
PLATELET # BLD AUTO: 217 10*3/MM3 (ref 140–500)
PMV BLD AUTO: 9.7 FL (ref 6–12)
POTASSIUM BLD-SCNC: 3.7 MMOL/L (ref 3.5–5.2)
PROT SERPL-MCNC: 6.3 G/DL (ref 6–8.5)
PROT UR QL STRIP: NEGATIVE
RBC # BLD AUTO: 4.27 10*6/MM3 (ref 3.9–5.2)
SODIUM BLD-SCNC: 142 MMOL/L (ref 136–145)
SP GR UR STRIP: <=1.005 (ref 1–1.03)
UROBILINOGEN UR QL STRIP: ABNORMAL
WBC NRBC COR # BLD: 5.5 10*3/MM3 (ref 4.5–10.7)

## 2018-05-10 PROCEDURE — 80053 COMPREHEN METABOLIC PANEL: CPT | Performed by: INTERNAL MEDICINE

## 2018-05-10 PROCEDURE — 80307 DRUG TEST PRSMV CHEM ANLYZR: CPT | Performed by: INTERNAL MEDICINE

## 2018-05-10 PROCEDURE — 81003 URINALYSIS AUTO W/O SCOPE: CPT | Performed by: EMERGENCY MEDICINE

## 2018-05-10 PROCEDURE — 25010000002 ONDANSETRON PER 1 MG: Performed by: INTERNAL MEDICINE

## 2018-05-10 PROCEDURE — 96361 HYDRATE IV INFUSION ADD-ON: CPT

## 2018-05-10 PROCEDURE — 96372 THER/PROPH/DIAG INJ SC/IM: CPT

## 2018-05-10 PROCEDURE — 96376 TX/PRO/DX INJ SAME DRUG ADON: CPT

## 2018-05-10 PROCEDURE — 25010000002 ENOXAPARIN PER 10 MG: Performed by: INTERNAL MEDICINE

## 2018-05-10 PROCEDURE — 96375 TX/PRO/DX INJ NEW DRUG ADDON: CPT

## 2018-05-10 PROCEDURE — G0378 HOSPITAL OBSERVATION PER HR: HCPCS

## 2018-05-10 PROCEDURE — 85027 COMPLETE CBC AUTOMATED: CPT | Performed by: INTERNAL MEDICINE

## 2018-05-10 RX ORDER — PROMETHAZINE HYDROCHLORIDE 12.5 MG/1
25 SUPPOSITORY RECTAL EVERY 6 HOURS PRN
Start: 2018-05-10 | End: 2018-11-20

## 2018-05-10 RX ORDER — HYDROCODONE BITARTRATE AND ACETAMINOPHEN 5; 325 MG/1; MG/1
1 TABLET ORAL EVERY 6 HOURS PRN
Status: DISCONTINUED | OUTPATIENT
Start: 2018-05-10 | End: 2018-05-10 | Stop reason: HOSPADM

## 2018-05-10 RX ORDER — PROMETHAZINE HYDROCHLORIDE 12.5 MG/1
12.5 SUPPOSITORY RECTAL EVERY 6 HOURS PRN
Status: DISCONTINUED | OUTPATIENT
Start: 2018-05-10 | End: 2018-05-10 | Stop reason: HOSPADM

## 2018-05-10 RX ORDER — LAMOTRIGINE 25 MG/1
25 TABLET ORAL DAILY
Status: DISCONTINUED | OUTPATIENT
Start: 2018-05-10 | End: 2018-05-10 | Stop reason: HOSPADM

## 2018-05-10 RX ORDER — FAMOTIDINE 10 MG/ML
20 INJECTION, SOLUTION INTRAVENOUS EVERY 12 HOURS SCHEDULED
Status: DISCONTINUED | OUTPATIENT
Start: 2018-05-10 | End: 2018-05-10 | Stop reason: HOSPADM

## 2018-05-10 RX ORDER — ARIPIPRAZOLE 2 MG/1
2 TABLET ORAL DAILY
Status: DISCONTINUED | OUTPATIENT
Start: 2018-05-10 | End: 2018-05-10 | Stop reason: HOSPADM

## 2018-05-10 RX ADMIN — HYDROCODONE BITARTRATE AND ACETAMINOPHEN 1 TABLET: 5; 325 TABLET ORAL at 05:48

## 2018-05-10 RX ADMIN — SODIUM CHLORIDE 150 ML/HR: 9 INJECTION, SOLUTION INTRAVENOUS at 09:10

## 2018-05-10 RX ADMIN — PROMETHAZINE HYDROCHLORIDE 12.5 MG: 25 SUPPOSITORY RECTAL at 01:55

## 2018-05-10 RX ADMIN — FAMOTIDINE 20 MG: 10 INJECTION, SOLUTION INTRAVENOUS at 09:10

## 2018-05-10 RX ADMIN — PROMETHAZINE HYDROCHLORIDE 12.5 MG: 25 SUPPOSITORY RECTAL at 10:43

## 2018-05-10 RX ADMIN — ENOXAPARIN SODIUM 40 MG: 40 INJECTION SUBCUTANEOUS at 08:58

## 2018-05-10 RX ADMIN — ARIPIPRAZOLE 2 MG: 2 TABLET ORAL at 08:58

## 2018-05-10 RX ADMIN — OSELTAMIVIR PHOSPHATE 75 MG: 75 CAPSULE ORAL at 01:54

## 2018-05-10 RX ADMIN — FAMOTIDINE 20 MG: 10 INJECTION, SOLUTION INTRAVENOUS at 01:55

## 2018-05-10 RX ADMIN — LAMOTRIGINE 25 MG: 25 TABLET ORAL at 08:58

## 2018-05-10 RX ADMIN — OSELTAMIVIR PHOSPHATE 75 MG: 75 CAPSULE ORAL at 08:58

## 2018-05-10 RX ADMIN — ONDANSETRON 4 MG: 2 INJECTION INTRAMUSCULAR; INTRAVENOUS at 14:56

## 2018-05-10 RX ADMIN — ONDANSETRON 4 MG: 2 INJECTION INTRAMUSCULAR; INTRAVENOUS at 08:58

## 2018-05-10 NOTE — PROGRESS NOTES
Clinical Pharmacy Services: Medication History    Evangelina Mtz is a 38 y.o. female presenting to [facility] for   Chief Complaint   Patient presents with   • Vomiting   • Nausea   • Diarrhea   • Dizziness   • Weakness - Generalized       She  has a past medical history of Anxiety; Chronic pain syndrome; Depression; Fatigue; Fibromyalgia; Insomnia; ELIZABETH (obstructive sleep apnea); and PCOS (polycystic ovarian syndrome).    Allergies as of 05/09/2018 - Reviewed 05/09/2018   Allergen Reaction Noted   • Amoxicillin  06/20/2016   • Ceclor [cefaclor]  06/20/2016   • Codeine  06/20/2016   • Eggs or egg-derived products  06/20/2016   • Penicillins  06/20/2016   • Rocephin [ceftriaxone]  06/20/2016   • Sulfa antibiotics  11/01/2017       Medication information was obtained from: Patient  Pharmacy and Phone Number: Dorothy Valdez [phone]    Prior to Admission Medications     Prescriptions Last Dose Informant Patient Reported? Taking?    ARIPiprazole (ABILIFY) 2 MG tablet  Self Yes Yes    Take 2 mg by mouth Daily.    estradiol (ESTRACE) 1 MG tablet  Self Yes Yes    Take 1 mg by mouth Daily.    lamoTRIgine (LaMICtal) 25 MG tablet  Self Yes Yes    Take 25 mg by mouth Daily.    oseltamivir (TAMIFLU) 75 MG capsule  Self Yes Yes    Take 75 mg by mouth 2 (Two) Times a Day.    Prenatal Vit-Fe Fumarate-FA (PRENATAL, CLASSIC, VITAMIN) 28-0.8 MG tablet tablet  Self Yes No    Take  by mouth daily.    sertraline (ZOLOFT) 50 MG tablet  Self Yes No    Take 50 mg by mouth Every Night.    zolpidem (AMBIEN) 10 MG tablet  Self Yes Yes    Take 10 mg by mouth At Night As Needed.            Medication notes: Left I-Vent for follow up by clinical pharmacist. Patient states she takes something for neuropathy but was unsure of the name and dosage. I'm guessing Gabapentin which she says sounds familiar but was not certain.    This medication list is complete to the best of my knowledge as of 5/9/2018    Please call if  questions.    Peggy Blanca, Medication History Technician  5/9/2018 9:57 PM

## 2018-05-10 NOTE — PLAN OF CARE
Problem: Patient Care Overview  Goal: Plan of Care Review  Outcome: Ongoing (interventions implemented as appropriate)   05/10/18 0519   Coping/Psychosocial   Plan of Care Reviewed With patient   Plan of Care Review   Progress improving   OTHER   Outcome Summary pt received from ed to room 682. Pt alert and oriented x4, intermittent nausea, ivf's hung, tamiflu and nausea med given, bladder scan done at 2330 and 309. MD notified     Goal: Individualization and Mutuality  Outcome: Ongoing (interventions implemented as appropriate)    Goal: Discharge Needs Assessment  Outcome: Ongoing (interventions implemented as appropriate)    Goal: Interprofessional Rounds/Family Conf  Outcome: Ongoing (interventions implemented as appropriate)      Problem: Nausea/Vomiting (Adult)  Goal: Identify Related Risk Factors and Signs and Symptoms  Outcome: Outcome(s) achieved Date Met: 05/10/18    Goal: Symptom Relief  Outcome: Ongoing (interventions implemented as appropriate)    Goal: Adequate Hydration  Outcome: Ongoing (interventions implemented as appropriate)

## 2018-05-10 NOTE — H&P
Name: Evangelina Mtz ADMIT: 2018   : 1980  PCP: ALEX Adams    MRN: [id number] LOS: 0 days   AGE/SEX: 38 y.o. female  ROOM: [facility]/     Chief Complaint   Patient presents with   • Vomiting   • Nausea   • Diarrhea   • Dizziness   • Weakness - Generalized       Subjective   Ms. Mtz is a 38 y.o. female with a history of fibromyalgia, anxiety, and chronic pain that presents to [facility] complaining of nausea, vomiting, diarrhea and no urination for the past 3 days. She states that she started having fever, chills, body aches and fatigue on  and then nausea, vomiting and diarrhea beginning Monday. Was seen at [facility] on Monday and was diagnosed and treated for influenza A, but states she has been unable to keep any of her Tamiflu down due to vomiting. At that time she was told to go to ER if she was unable to make any urine over the next day. She does admit to some shortness of air and a cough that started last night, but denies chest pain.  Her nausea and vomiting continue but her diarrhea has resolved.    Of note, she was on a camping trip when symptoms started.  She walked in a stream but only drank bottled water.  No family members are ill.      History of Present Illness    Past Medical History:   Diagnosis Date   • Anxiety    • Chronic pain syndrome    • Depression    • Fatigue    • Fibromyalgia    • Insomnia    • ELIZABETH (obstructive sleep apnea)    • PCOS (polycystic ovarian syndrome)      Past Surgical History:   Procedure Laterality Date   •  SECTION     • CHOLECYSTECTOMY     • HYSTERECTOMY     • TONSILLECTOMY       History reviewed. No pertinent family history.  Social History   Substance Use Topics   • Smoking status: Never Smoker   • Smokeless tobacco: Not on file   • Alcohol use No     Prescriptions Prior to Admission   Medication Sig Dispense Refill Last Dose   • ARIPiprazole (ABILIFY) 2 MG tablet Take 2 mg by mouth Daily.      • estradiol  (ESTRACE) 1 MG tablet Take 1 mg by mouth Daily.   11/1/2017 at Unknown time   • lamoTRIgine (LaMICtal) 25 MG tablet Take 25 mg by mouth Daily.      • oseltamivir (TAMIFLU) 75 MG capsule Take 75 mg by mouth 2 (Two) Times a Day.      • zolpidem (AMBIEN) 10 MG tablet Take 10 mg by mouth At Night As Needed.      • Prenatal Vit-Fe Fumarate-FA (PRENATAL, CLASSIC, VITAMIN) 28-0.8 MG tablet tablet Take  by mouth daily.      • sertraline (ZOLOFT) 50 MG tablet Take 50 mg by mouth Every Night.   10/31/2017 at Unknown time     Allergies:    Allergies   Allergen Reactions   • Amoxicillin    • Ceclor [Cefaclor]    • Codeine    • Eggs Or Egg-Derived Products    • Penicillins    • Rocephin [Ceftriaxone]    • Sulfa Antibiotics        Review of Systems   Constitutional: Positive for activity change, appetite change, chills, fatigue and fever.   HENT: Positive for congestion.    Eyes: Negative.  Negative for visual disturbance.   Respiratory: Positive for cough and shortness of breath.    Cardiovascular: Positive for leg swelling. Negative for chest pain and palpitations.   Gastrointestinal: Positive for abdominal pain, diarrhea, nausea and vomiting.   Endocrine: Negative.    Genitourinary: Positive for difficulty urinating. Negative for dysuria, flank pain, frequency and urgency.        States she hasn't urinated in 3 days   Musculoskeletal: Positive for myalgias. Negative for arthralgias and back pain.   Skin: Negative.  Negative for color change, pallor and rash.   Allergic/Immunologic: Negative.    Neurological: Positive for dizziness, weakness, light-headedness and headaches.   Hematological: Negative.  Negative for adenopathy.   Psychiatric/Behavioral: Negative.  Negative for agitation, behavioral problems and confusion.        Objective    Vital Signs  Temp:  [98 °F (36.7 °C)-98.9 °F (37.2 °C)] 98.7 °F (37.1 °C)  Heart Rate:  [] 93  Resp:  [16-18] 18  BP: ()/(41-98) 124/76  SpO2:  [94 %-100 %] 98 %  on   ;   Device  (Oxygen Therapy): room air  Body mass index is 34.71 kg/m².    Physical Exam   Constitutional: She is oriented to person, place, and time. She appears well-developed and well-nourished. No distress.   HENT:   Head: Normocephalic and atraumatic.   Mildly dry mucus membranes   Eyes: EOM are normal. Pupils are equal, round, and reactive to light.   Neck: Normal range of motion. Neck supple.   Cardiovascular: Normal rate, regular rhythm and normal heart sounds.    No murmur heard.  Pulmonary/Chest: Effort normal and breath sounds normal.   Abdominal: Soft. Bowel sounds are normal. There is tenderness (diffuse).   Musculoskeletal: Normal range of motion. She exhibits no edema or tenderness.   Neurological: She is alert and oriented to person, place, and time.   Skin: Skin is warm and dry. No erythema.   Psychiatric: She has a normal mood and affect. Her behavior is normal.       Results Review:   I reviewed the patient's new clinical results including all labs and xray's.    Lab Results (last 24 hours)     Procedure Component Value Units Date/Time    CBC & Differential [[id number]] Collected:  05/09/18 1650    Specimen:  Blood Updated:  05/09/18 1706    Narrative:       The following orders were created for panel order CBC & Differential.  Procedure                               Abnormality         Status                     ---------                               -----------         ------                     CBC Auto Differential[[id number]]        Abnormal            Final result                 Please view results for these tests on the individual orders.    Comprehensive Metabolic Panel [[id number]]  (Abnormal) Collected:  05/09/18 1650    Specimen:  Blood Updated:  05/09/18 1729     Glucose 115 (H) mg/dL      BUN 9 mg/dL      Creatinine 0.66 mg/dL      Sodium 141 mmol/L      Potassium 4.1 mmol/L      Chloride 106 mmol/L      CO2 21.3 (L) mmol/L      Calcium 9.7 mg/dL      Total Protein 7.7 g/dL      Albumin 4.50 g/dL       ALT (SGPT) 186 (H) U/L      AST (SGOT) 117 (H) U/L      Alkaline Phosphatase 136 (H) U/L      Total Bilirubin 0.3 mg/dL      eGFR Non African Amer 101 mL/min/1.73      Globulin 3.2 gm/dL      A/G Ratio 1.4 g/dL      BUN/Creatinine Ratio 13.6     Anion Gap 13.7 mmol/L     Lipase [[id number]]  (Normal) Collected:  05/09/18 1650    Specimen:  Blood Updated:  05/09/18 1729     Lipase 28 U/L     hCG, Serum, Qualitative [[id number]]  (Normal) Collected:  05/09/18 1650    Specimen:  Blood Updated:  05/09/18 1720     HCG Qualitative Negative    CBC Auto Differential [[id number]]  (Abnormal) Collected:  05/09/18 1650    Specimen:  Blood Updated:  05/09/18 1706     WBC 6.43 10*3/mm3      RBC 5.14 10*6/mm3      Hemoglobin 15.7 (H) g/dL      Hematocrit 47.3 (H) %      MCV 92.0 fL      MCH 30.5 pg      MCHC 33.2 g/dL      RDW 13.3 (H) %      RDW-SD 44.6 fl      MPV 9.9 fL      Platelets 276 10*3/mm3      Neutrophil % 63.5 %      Lymphocyte % 26.6 %      Monocyte % 7.3 %      Eosinophil % 2.0 %      Basophil % 0.6 %      Immature Grans % 0.0 %      Neutrophils, Absolute 4.08 10*3/mm3      Lymphocytes, Absolute 1.71 10*3/mm3      Monocytes, Absolute 0.47 10*3/mm3      Eosinophils, Absolute 0.13 10*3/mm3      Basophils, Absolute 0.04 10*3/mm3      Immature Grans, Absolute 0.00 10*3/mm3           No orders to display     Assessment/Plan      Active Hospital Problems (** Indicates Principal Problem)    Diagnosis Date Noted   • Intractable vomiting with nausea [R11.2] 05/09/2018   • Abdominal pain [R10.9] 05/09/2018   • Influenza [J11.1] 05/09/2018   • Decreased urination [R34] 05/09/2018   • LFT elevation [R79.89] 05/09/2018      Resolved Hospital Problems    Diagnosis Date Noted Date Resolved   No resolved problems to display.     Intractable nausea/vomiting  -suspect related to influenza or from a viral gastroenteritis. Don't think her camping trip is related since symptoms started during the trip  -? Some component of opioid  withdrawal now  - Patient admits much improvement after 2L IVF and zofran in ER  - Will continue PRN zofran, add phenergan suppository  - IVF @ 150ml/hr  - Clear liquids as tolerated  - May have PRN Bentyl as well for reported abdominal cramping    Influenza  - Will continue her Tamiflu as she states she has vomited prior doses she has attempted to take thus far  - Continue supportive care    Decreased urination  - Labs do not necessarily reflect the degree of dehydration that would be expected with the frequency of vomiting and diarrhea reported by patient  - Will continue IVF and bladder scan Q4 hours  - Recheck labs in AM    LFT Elevation  - Likely due to fatty liver as reported on CT abdomen, although these values have decreased since November 2017    Chronic Pain/fibromyalgia  -cont home meds  -hydrocodone not on PTA meds but I reviewed FREDDY and it was on that list  -start low dose Norco    VTE Prophylaxis  - Lovenox    Code Status  - Full    I discussed the patients findings and my recommendations with patient.      Ezio Griffin MD  [facility]  05/09/18  11:40 PM

## 2018-05-10 NOTE — PROGRESS NOTES
Case Management Discharge Note              Other:  (private vehicle)    Final Discharge Disposition Code: 01 - home or self-care

## 2018-05-10 NOTE — DISCHARGE SUMMARY
[facility]    Date of Discharge:  5/10/2018    PCP: ALEX Adams    Discharge Diagnosis:   Active Hospital Problems (** Indicates Principal Problem)    Diagnosis Date Noted   • Intractable vomiting with nausea [R11.2] 05/09/2018   • Abdominal pain [R10.9] 05/09/2018   • Influenza [J11.1] 05/09/2018   • Decreased urination [R34] 05/09/2018   • LFT elevation [R79.89] 05/09/2018      Resolved Hospital Problems    Diagnosis Date Noted Date Resolved   No resolved problems to display.     Procedures Performed       Consults     Date and Time Order Name Status Description    5/9/2018 2046 [facility] (on-call MD unless specified) Completed         Hospital Course  Please see history and physical for details. Patient is a 38 y.o. female admitted by one of my colleagues for complaints of  inability to urinate and dehydration.  The unique thing is that none of her lab work is consistent with dehydration as her BUNs was normal and urinalysis is negative for ketones.  She talks about this long-standing issue with inability to urinate and I believe has been evaluated by urology in an outpatient setting but bladder scans here have not revealed any aspects of urinary retention.  She conveys to me that she's had multiple bouts of emesis but when I discussed the case with the nurse none of this is been visualized though mother is at bedside stating she saw emesis ×1 in which she said was a very small amount and nonbloody.  At this juncture the patient was admitted to the hospital for observation status to help with use of IV fluids.  At this juncture she's producing urine independently.  While very pleasant I do have concerns for secondary gains.  Vital signs are stable as well as afebrile and labs are looking well.  If patient is truly affected by the flu I think she is safe for disposition to home where she can complete her regimen in an outpatient setting.  I'm  happy to supply her with a couple Phenergan suppositories to help if she has any additional nausea post discharge.  This should resolve independently as she is an otherwise healthy young female.  I discussed the case at length with both parents were at bedside as well as an additional family member as well as nurse present at bedside.      Condition on Discharge: Improved.     Temp:  [97.5 °F (36.4 °C)-98.9 °F (37.2 °C)] 97.5 °F (36.4 °C)  Heart Rate:  [] 75  Resp:  [16-18] 18  BP: ()/(41-98) 116/82  Body mass index is 34.71 kg/m².    Physical Exam   Constitutional: She is oriented to person, place, and time. She appears well-developed and well-nourished.   HENT:   Moist mucous membranes   Neck: Neck supple.   Cardiovascular: Normal rate and regular rhythm.    Pulmonary/Chest: Effort normal and breath sounds normal. No respiratory distress.   Abdominal: Soft. Bowel sounds are normal. She exhibits no distension. There is no tenderness. There is no guarding.   Musculoskeletal: She exhibits no edema.   Neurological: She is alert and oriented to person, place, and time.   Skin:   No decreased skin turgor     Discharge Medications   Rusty Mtzy   Home Medication Instructions LYNN:[id number]    Printed on:05/10/18 1449   Medication Information                      ARIPiprazole (ABILIFY) 2 MG tablet  Take 2 mg by mouth Daily.             estradiol (ESTRACE) 1 MG tablet  Take 1 mg by mouth Daily.             lamoTRIgine (LaMICtal) 25 MG tablet  Take 25 mg by mouth Daily.             oseltamivir (TAMIFLU) 75 MG capsule  Take 75 mg by mouth 2 (Two) Times a Day.             promethazine (PHENERGAN) 12.5 MG suppository  Insert 2 suppositories into the rectum Every 6 (Six) Hours As Needed for Nausea or Vomiting.                  Additional Instructions for the Follow-ups that You Need to Schedule     Discharge Follow-up with PCP    As directed      Follow Up Details:  pcp prn           Follow-up Information      Briana Armijo, APRN .    Specialty:  Family Medicine  Why:  pcp prn  Contact information:  [address] [facility] [address] [facility] [id number] [phone]                 Test Results Pending at Discharge     Nelson Gu MD  05/10/18  2:41 PM    Discharge time spent greater than 30 minutes.

## 2018-05-24 ENCOUNTER — OFFICE (OUTPATIENT)
Dept: URBAN - METROPOLITAN AREA CLINIC 75 | Facility: CLINIC | Age: 38
End: 2018-05-24
Payer: OTHER GOVERNMENT

## 2018-05-24 VITALS
HEART RATE: 82 BPM | WEIGHT: 216 LBS | DIASTOLIC BLOOD PRESSURE: 80 MMHG | SYSTOLIC BLOOD PRESSURE: 120 MMHG | RESPIRATION RATE: 18 BRPM | HEIGHT: 65 IN

## 2018-05-24 DIAGNOSIS — K21.9 GASTRO-ESOPHAGEAL REFLUX DISEASE WITHOUT ESOPHAGITIS: ICD-10-CM

## 2018-05-24 DIAGNOSIS — R10.84 GENERALIZED ABDOMINAL PAIN: ICD-10-CM

## 2018-05-24 DIAGNOSIS — K59.00 CONSTIPATION, UNSPECIFIED: ICD-10-CM

## 2018-05-24 DIAGNOSIS — R19.7 DIARRHEA, UNSPECIFIED: ICD-10-CM

## 2018-05-24 DIAGNOSIS — R11.2 NAUSEA WITH VOMITING, UNSPECIFIED: ICD-10-CM

## 2018-05-24 PROCEDURE — 99204 OFFICE O/P NEW MOD 45 MIN: CPT | Performed by: INTERNAL MEDICINE

## 2018-05-24 RX ORDER — PLECANATIDE 3 MG/1
3 TABLET ORAL
Qty: 30 | Refills: 11 | Status: ACTIVE
Start: 2018-05-24

## 2018-05-24 RX ORDER — PANTOPRAZOLE SODIUM 40 MG/1
40 TABLET, DELAYED RELEASE ORAL
Qty: 30 | Refills: 11 | Status: ACTIVE
Start: 2018-05-24

## 2018-09-12 ENCOUNTER — TRANSCRIBE ORDERS (OUTPATIENT)
Dept: PHYSICAL THERAPY | Facility: HOSPITAL | Age: 38
End: 2018-09-12

## 2018-09-12 DIAGNOSIS — M79.7 FIBROMYALGIA: Primary | ICD-10-CM

## 2018-09-24 ENCOUNTER — APPOINTMENT (OUTPATIENT)
Dept: PHYSICAL THERAPY | Facility: HOSPITAL | Age: 38
End: 2018-09-24
Attending: INTERNAL MEDICINE

## 2018-09-26 ENCOUNTER — HOSPITAL ENCOUNTER (OUTPATIENT)
Dept: PHYSICAL THERAPY | Facility: HOSPITAL | Age: 38
Setting detail: THERAPIES SERIES
Discharge: HOME | End: 2018-09-26

## 2018-09-26 DIAGNOSIS — R26.2 DIFFICULTY WALKING: ICD-10-CM

## 2018-09-26 DIAGNOSIS — G89.29 CHRONIC MIDLINE LOW BACK PAIN WITHOUT SCIATICA: Primary | ICD-10-CM

## 2018-09-26 DIAGNOSIS — R52 PAIN OF MULTIPLE SITES: ICD-10-CM

## 2018-09-26 DIAGNOSIS — M54.50 CHRONIC MIDLINE LOW BACK PAIN WITHOUT SCIATICA: Primary | ICD-10-CM

## 2018-09-26 DIAGNOSIS — R53.1 WEAKNESS GENERALIZED: ICD-10-CM

## 2018-09-26 PROCEDURE — 97110 THERAPEUTIC EXERCISES: CPT | Performed by: PHYSICAL THERAPIST

## 2018-09-26 PROCEDURE — 97162 PT EVAL MOD COMPLEX 30 MIN: CPT | Performed by: PHYSICAL THERAPIST

## 2018-09-26 NOTE — THERAPY EVALUATION
Outpatient Physical Therapy Ortho Initial Evaluation  [facility]     Patient Name: Evangelina Mzt  : 1980  MRN: [id number]  Today's Date: 2018      Visit Date: 2018    Patient Active Problem List   Diagnosis   • Intractable vomiting with nausea   • Abdominal pain   • Influenza   • Decreased urination   • LFT elevation        Past Medical History:   Diagnosis Date   • Anxiety    • Chronic pain syndrome    • Depression    • Fatigue    • Fibromyalgia    • Insomnia    • ELIZABETH (obstructive sleep apnea)    • PCOS (polycystic ovarian syndrome)         Past Surgical History:   Procedure Laterality Date   •  SECTION     • CHOLECYSTECTOMY     • HYSTERECTOMY     • TONSILLECTOMY         Visit Dx:     ICD-10-CM ICD-9-CM   1. Chronic midline low back pain without sciatica M54.5 724.2    G89.29 338.29   2. Pain of multiple sites R52 780.96   3. Difficulty walking R26.2 719.7   4. Weakness generalized R53.1 780.79             Patient History     Row Name 18 1100             History    Chief Complaint Difficulty Walking;Difficulty with daily activities;Pain;Muscle weakness  -MP      Type of Pain Back pain;Other pain   Multiple areas of the body  -MP      Date Current Problem(s) Began 12  -MP      Brief Description of Current Complaint Evangelina reports that she has been experiencing pain in multiple areas of her body, especially the low back, approximately since her 6 year old daughter was born.  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  Currently pain peaks at 10/10, pain is constant and its least amount is 7/10.  She also experiences parasthesia in her hands.  PMH:  2 pregnancies with one miscarriage, otherwise she has no  major medical issues.     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  She enjoys reading, staying active with her daughter's activities, camping and outdoor activities.  -MP      Are you or can you be pregnant No  -MP         Pain     Is your sleep disturbed? Yes  -MP      Is medication used to assist with sleep? Yes  -MP      Total hours of sleep per night 4-5  -MP      What position do you sleep in? Left sidelying  -MP         Fall Risk Assessment    Any falls in the past year: No  -MP      Number of falls reported in the last 12 months zero  -MP         Services    Are you currently receiving Home Health services No  -MP         Daily Activities    Primary Language English  -MP      Are you able to read Yes  -MP      Are you able to write Yes  -MP      How does patient learn best? --   Hands on participation  -MP      Pt Participated in POC and Goals Yes  -MP         Safety    Are you being hurt, hit, or frightened by anyone at home or in your life? No  -MP      Are you being neglected by a caregiver No  -MP        User Key  (r) = Recorded By, (t) = Taken By, (c) = Cosigned By    Initials Name Provider Type    Helder Delacruz PT Physical Therapist                PT Ortho     Row Name 09/26/18 1100       Posture/Observations    Posture/Observations Comments In standing, posterior view of the spine, R shoulder, scapula and ilium are higher than her L.  There is a slight scoliosis with L lumbar concavity.  Laterally, she presents with forward head, Dowarger's at the CT junction, increased thoracic kyphosis and slight increase in lumbar lordosis.    -MP       Quarter Clearing    Quarter  Clearing Lower Quarter Clearing  -MP       Pathological Reflexes- Lower Quarter Clearing    Clonus Negative  -MP    Babinski Negative  -MP       Sensory Screen for Light Touch- Lower Quarter Clearing    L1 (inguinal area) Bilateral:;Intact  -MP    L2 (anterior mid thigh) Bilateral:;Intact  -MP    L3 (distal anterior thigh) Bilateral:;Intact  -MP    L4 (medial lower leg/foot) Bilateral:;Intact  -MP    L5 (lateral lower leg/great toe) Bilateral:;Intact  -MP    S1 (bottom of foot) Bilateral:;Intact  -MP       Myotomal Screen- Lower Quarter Clearing    Hip flexion (L2) Bilateral:;5 (Normal)  -MP    Knee extension (L3) Bilateral:;5 (Normal)  -MP    Ankle DF (L4) Bilateral:;5 (Normal)  -MP    Great toe extension (L5) Bilateral:;5 (Normal)  -MP    Ankle PF (S1) Bilateral:;5 (Normal)  -MP    Knee flexion (S2) Bilateral:;5 (Normal)  -MP       Lumbar ROM Screen- Lower Quarter Clearing    Lumbar Flexion Impaired   Moderate loss with end range pain  -MP    Lumbar Extension Impaired   Moderate loss with end range pain  -MP    Lumbar Lateral Flexion Impaired   Moderate loss with end range pain  -MP    Lumbar Rotation Impaired   In sitting, moderate to severe loss B, greater pain to the L  -MP       General ROM    GENERAL ROM COMMENTS In standing, B hip flexion to 90 degress, grossly, B, hip abduction B minimal to moderate loss and extension B minimal to moderate loss.   -MP       Gait/Stairs Assessment/Training    Comment (Gait/Stairs) Ambulated, 60 feet, on level surface, using no assistive device, with a normal gait pattern.  -MP      User Key  (r) = Recorded By, (t) = Taken By, (c) = Cosigned By    Initials Name Provider Type    Helder Delacruz PT Physical Therapist          Therapy Education  Education Details: Initial land HEP began with lumbar bracing, craniovertebral flexion and wand flexion.  All to be performed in hooklying with towel roll at neck.  Given: HEP, Symptoms/condition management  Program: New  How Provided:  Written  Provided to: Patient  Level of Understanding: Teach back education performed           PT OP Goals     Row Name 09/26/18 1300          PT Short Term Goals    STG Date to Achieve 10/26/18  -MP     STG 1 Evangelina skilled agua therapy and is able to tolerate a full session without great difficulty.  -MP     STG 1 Progress New  -MP     STG 2 She is independent with aqua therapy exercises.  -MP     STG 2 Progress New  -MP     STG 3 PSFS disability improves to 50%.  -MP     STG 3 Progress New  -MP        Long Term Goals    LTG Date to Achieve 12/25/18  -MP     LTG 1 Evangelina is able to perform standing lumbar spine AROM, cardinal planes, pain free.  -MP     LTG 1 Progress New  -MP     LTG 2 Evangelina is independent with a complete HEP and general self care education.  -MP     LTG 2 Progress New  -MP     LTG 3 PSFS disability is below 40%.  -MP     LTG 3 Progress New  -MP        Time Calculation    PT Goal Re-Cert Due Date 10/26/18  -MP       User Key  (r) = Recorded By, (t) = Taken By, (c) = Cosigned By    Initials Name Provider Type    Helder Delacruz, PT Physical Therapist                PT Assessment/Plan     Row Name 09/26/18 1330          PT Assessment    Functional Limitations Impaired locomotion;Limitations in community activities;Limitations in functional capacity and performance;Performance in leisure activities  -MP     Impairments Posture;Range of motion;Pain;Joint mobility;Endurance;Locomotion;Poor body mechanics  -MP     Assessment Comments Ms. Mtz has chronic pain and loss of ADL function secondary to fibromyalgia.  This issue is evolving and due to the chronic nature and long term loss of fucntion involved with it she will be moderately challenged for rehab purposes.  -MP     Please refer to paper survey for additional self-reported information Yes  -MP     Rehab Potential Good  -MP     Patient/caregiver participated in establishment of treatment plan and goals Yes  -MP     Patient would benefit  from skilled therapy intervention Yes  -MP        PT Plan    PT Frequency --   1-2 x per week  -MP     Predicted Duration of Therapy Intervention (Therapy Eval) 6-8 weeks  -MP     Planned CPT's? PT EVAL MOD COMPLELITY: 25543;PT RE-EVAL: 06430;PT THER ACT EA 15 MIN: 75579;PT THER PROC EA 15 MIN: 92078;PT MANUAL THERAPY EA 15 MIN: 69433;PT AQUATIC THERAPY EA 15 MIN: 05710;PT SELF CARE/HOME MGMT/TRAIN EA 15: 42569;PT ELECTRICAL STIM UNATTEND: ;PT ULTRASOUND EA 15 MIN: 21529  -MP     PT Plan Comments Sophie is to begin aqua therapy for 4 weeks and finish out on land to find exercises that she can do at home to help with pain and improve function.  -MP       User Key  (r) = Recorded By, (t) = Taken By, (c) = Cosigned By    Initials Name Provider Type    Helder Delacruz, PT Physical Therapist                  Exercises     Row Name 09/26/18 1300             Total Minutes    69188 - PT Therapeutic Exercise Minutes 10  -MP         Exercise 1    Exercise Name 1 In hooklying, towel roll at neck, she performed lumbar bracing, 10s x 5, craniovertebral flexion x 10 and wand flexion x 5.  -MP        User Key  (r) = Recorded By, (t) = Taken By, (c) = Cosigned By    Initials Name Provider Type    Helder Delacruz, PT Physical Therapist           Outcome Measure Options: Patient Specific Functional Scale  Patient Specific Functional Scale  Activity 1: Going shopping.  Activity 1 Score: 7  Activity 2: Lifting objects.  Activity 2 Score: 2  Activity 3: Walking for exercise.   Activity 3 Score: 3  Total Score: 0  Patient Specific Functional Scale Commetns: 12/30, 60% disability      Time Calculation:     Therapy Suggested Charges     Code   Minutes Charges    03950 (CPT®) Hc Pt Neuromusc Re Education Ea 15 Min      47978 (CPT®) Hc Pt Ther Proc Ea 15 Min 10 1    85064 (CPT®) Hc Gait Training Ea 15 Min      20969 (CPT®) Hc Pt Therapeutic Act Ea 15 Min      06983 (CPT®) Hc Pt Manual Therapy Ea 15 Min      45360 (CPT®) Hc Pt Ther  Massage- Per 15 Min      69492 (CPT®) Hc Pt Iontophoresis Ea 15 Min      77590 (CPT®) Hc Pt Elec Stim Ea-Per 15 Min      72197 (CPT®) Hc Pt Ultrasound Ea 15 Min      45360 (CPT®) Hc Pt Self Care/Mgmt/Train Ea 15 Min      98175 (CPT®) Hc Pt Prosthetic (S) Train Initial Encounter, Each 15 Min      81988 (CPT®) Hc Orthotic(S) Mgmt/Train Initial Encounter, Each 15min      18279 (CPT®) Hc Pt Aquatic Therapy Ea 15 Min      35007 (CPT®) Hc Pt Orthotic(S)/Prosthetic(S) Encounter, Each 15 Min      Total  10 1          Start Time: 1115  Stop Time: 1200  Time Calculation (min): 45 min     Therapy Charges for Today     Code Description Service Date Service Provider Modifiers Qty    44200452537 HC PT THER PROC EA 15 MIN 9/26/2018 Helder Barker, PT GP 1    45314597158 HC PT EVAL MOD COMPLEXITY 2 9/26/2018 Helder Barker, PT GP 1          PT G-Codes  Outcome Measure Options: Patient Specific Functional Scale         Helder Barker PT  9/26/2018

## 2018-10-02 ENCOUNTER — HOSPITAL ENCOUNTER (OUTPATIENT)
Dept: PHYSICAL THERAPY | Facility: HOSPITAL | Age: 38
Setting detail: THERAPIES SERIES
End: 2018-10-02

## 2018-10-04 ENCOUNTER — APPOINTMENT (OUTPATIENT)
Dept: PHYSICAL THERAPY | Facility: HOSPITAL | Age: 38
End: 2018-10-04

## 2018-10-09 ENCOUNTER — APPOINTMENT (OUTPATIENT)
Dept: PHYSICAL THERAPY | Facility: HOSPITAL | Age: 38
End: 2018-10-09

## 2018-10-11 ENCOUNTER — APPOINTMENT (OUTPATIENT)
Dept: PHYSICAL THERAPY | Facility: HOSPITAL | Age: 38
End: 2018-10-11

## 2018-10-16 ENCOUNTER — APPOINTMENT (OUTPATIENT)
Dept: PHYSICAL THERAPY | Facility: HOSPITAL | Age: 38
End: 2018-10-16

## 2018-10-18 ENCOUNTER — APPOINTMENT (OUTPATIENT)
Dept: PHYSICAL THERAPY | Facility: HOSPITAL | Age: 38
End: 2018-10-18

## 2018-10-24 ENCOUNTER — APPOINTMENT (OUTPATIENT)
Dept: PHYSICAL THERAPY | Facility: HOSPITAL | Age: 38
End: 2018-10-24

## 2018-10-26 ENCOUNTER — APPOINTMENT (OUTPATIENT)
Dept: PHYSICAL THERAPY | Facility: HOSPITAL | Age: 38
End: 2018-10-26

## 2018-10-30 ENCOUNTER — APPOINTMENT (OUTPATIENT)
Dept: PHYSICAL THERAPY | Facility: HOSPITAL | Age: 38
End: 2018-10-30

## 2018-11-01 ENCOUNTER — APPOINTMENT (OUTPATIENT)
Dept: PHYSICAL THERAPY | Facility: HOSPITAL | Age: 38
End: 2018-11-01

## 2018-11-19 ENCOUNTER — APPOINTMENT (OUTPATIENT)
Dept: GENERAL RADIOLOGY | Facility: HOSPITAL | Age: 38
End: 2018-11-19

## 2018-11-19 ENCOUNTER — HOSPITAL ENCOUNTER (EMERGENCY)
Facility: HOSPITAL | Age: 38
Discharge: HOME | End: 2018-11-20
Attending: EMERGENCY MEDICINE | Admitting: EMERGENCY MEDICINE

## 2018-11-19 DIAGNOSIS — R07.9 LEFT SIDED CHEST PAIN: ICD-10-CM

## 2018-11-19 DIAGNOSIS — M94.0 COSTOCHONDRITIS: Primary | ICD-10-CM

## 2018-11-19 LAB
ALBUMIN SERPL-MCNC: 4.4 G/DL (ref 3.5–5.2)
ALBUMIN/GLOB SERPL: 1.3 G/DL
ALP SERPL-CCNC: 161 U/L (ref 39–117)
ALT SERPL W P-5'-P-CCNC: 110 U/L (ref 1–33)
ANION GAP SERPL CALCULATED.3IONS-SCNC: 13.9 MMOL/L
AST SERPL-CCNC: 65 U/L (ref 1–32)
BASOPHILS # BLD AUTO: 0.03 10*3/MM3 (ref 0–0.2)
BASOPHILS NFR BLD AUTO: 0.4 % (ref 0–1.5)
BILIRUB SERPL-MCNC: 0.3 MG/DL (ref 0.1–1.2)
BUN BLD-MCNC: 9 MG/DL (ref 6–20)
BUN/CREAT SERPL: 14.3 (ref 7–25)
CALCIUM SPEC-SCNC: 10.3 MG/DL (ref 8.6–10.5)
CHLORIDE SERPL-SCNC: 102 MMOL/L (ref 98–107)
CO2 SERPL-SCNC: 27.1 MMOL/L (ref 22–29)
CREAT BLD-MCNC: 0.63 MG/DL (ref 0.57–1)
D DIMER PPP FEU-MCNC: 0.35 MCGFEU/ML (ref 0–0.49)
DEPRECATED RDW RBC AUTO: 41.7 FL (ref 37–54)
EOSINOPHIL # BLD AUTO: 0.15 10*3/MM3 (ref 0–0.7)
EOSINOPHIL NFR BLD AUTO: 2 % (ref 0.3–6.2)
ERYTHROCYTE [DISTWIDTH] IN BLOOD BY AUTOMATED COUNT: 12.7 % (ref 11.7–13)
GFR SERPL CREATININE-BSD FRML MDRD: 107 ML/MIN/1.73
GLOBULIN UR ELPH-MCNC: 3.5 GM/DL
GLUCOSE BLD-MCNC: 89 MG/DL (ref 65–99)
HCT VFR BLD AUTO: 42.6 % (ref 35.6–45.5)
HGB BLD-MCNC: 14.6 G/DL (ref 11.9–15.5)
IMM GRANULOCYTES # BLD: 0.01 10*3/MM3 (ref 0–0.03)
IMM GRANULOCYTES NFR BLD: 0.1 % (ref 0–0.5)
LYMPHOCYTES # BLD AUTO: 2.03 10*3/MM3 (ref 0.9–4.8)
LYMPHOCYTES NFR BLD AUTO: 27.5 % (ref 19.6–45.3)
MCH RBC QN AUTO: 30.9 PG (ref 26.9–32)
MCHC RBC AUTO-ENTMCNC: 34.3 G/DL (ref 32.4–36.3)
MCV RBC AUTO: 90.3 FL (ref 80.5–98.2)
MONOCYTES # BLD AUTO: 0.71 10*3/MM3 (ref 0.2–1.2)
MONOCYTES NFR BLD AUTO: 9.6 % (ref 5–12)
NEUTROPHILS # BLD AUTO: 4.45 10*3/MM3 (ref 1.9–8.1)
NEUTROPHILS NFR BLD AUTO: 60.5 % (ref 42.7–76)
PLATELET # BLD AUTO: 283 10*3/MM3 (ref 140–500)
PMV BLD AUTO: 10.2 FL (ref 6–12)
POTASSIUM BLD-SCNC: 3.8 MMOL/L (ref 3.5–5.2)
PROT SERPL-MCNC: 7.9 G/DL (ref 6–8.5)
RBC # BLD AUTO: 4.72 10*6/MM3 (ref 3.9–5.2)
SODIUM BLD-SCNC: 143 MMOL/L (ref 136–145)
TROPONIN T SERPL-MCNC: <0.01 NG/ML (ref 0–0.03)
WBC NRBC COR # BLD: 7.37 10*3/MM3 (ref 4.5–10.7)

## 2018-11-19 PROCEDURE — 93005 ELECTROCARDIOGRAM TRACING: CPT | Performed by: EMERGENCY MEDICINE

## 2018-11-19 PROCEDURE — 99284 EMERGENCY DEPT VISIT MOD MDM: CPT

## 2018-11-19 PROCEDURE — 85025 COMPLETE CBC W/AUTO DIFF WBC: CPT | Performed by: EMERGENCY MEDICINE

## 2018-11-19 PROCEDURE — 80053 COMPREHEN METABOLIC PANEL: CPT | Performed by: EMERGENCY MEDICINE

## 2018-11-19 PROCEDURE — 85379 FIBRIN DEGRADATION QUANT: CPT | Performed by: EMERGENCY MEDICINE

## 2018-11-19 PROCEDURE — 93005 ELECTROCARDIOGRAM TRACING: CPT

## 2018-11-19 PROCEDURE — 71046 X-RAY EXAM CHEST 2 VIEWS: CPT

## 2018-11-19 PROCEDURE — 93010 ELECTROCARDIOGRAM REPORT: CPT | Performed by: INTERNAL MEDICINE

## 2018-11-19 PROCEDURE — 84484 ASSAY OF TROPONIN QUANT: CPT | Performed by: EMERGENCY MEDICINE

## 2018-11-19 RX ORDER — DULOXETIN HYDROCHLORIDE 60 MG/1
60 CAPSULE, DELAYED RELEASE ORAL DAILY
COMMUNITY
End: 2021-10-12 | Stop reason: ALTCHOICE

## 2018-11-19 RX ORDER — GABAPENTIN 100 MG/1
100 CAPSULE ORAL 3 TIMES DAILY
COMMUNITY
End: 2019-03-12

## 2018-11-19 RX ORDER — LORAZEPAM 1 MG/1
1 TABLET ORAL EVERY 8 HOURS PRN
COMMUNITY
End: 2019-03-12

## 2018-11-19 RX ORDER — ESTRADIOL 1 MG/1
1 TABLET ORAL DAILY
COMMUNITY
End: 2019-10-28

## 2018-11-19 RX ORDER — VALACYCLOVIR HYDROCHLORIDE 500 MG/1
1000 TABLET, FILM COATED ORAL DAILY
COMMUNITY
End: 2019-10-28

## 2018-11-20 VITALS
HEART RATE: 79 BPM | BODY MASS INDEX: 36.32 KG/M2 | WEIGHT: 218 LBS | DIASTOLIC BLOOD PRESSURE: 78 MMHG | HEIGHT: 65 IN | RESPIRATION RATE: 18 BRPM | TEMPERATURE: 98.1 F | OXYGEN SATURATION: 97 % | SYSTOLIC BLOOD PRESSURE: 123 MMHG

## 2018-11-20 LAB
HOLD SPECIMEN: NORMAL
HOLD SPECIMEN: NORMAL
WHOLE BLOOD HOLD SPECIMEN: NORMAL
WHOLE BLOOD HOLD SPECIMEN: NORMAL

## 2018-11-20 NOTE — ED NOTES
Patients presents to ED tonight with c/o chest pain. She reports that she began having a stabbing pain in her left anterior chest about 2 hours ago while she was watching TV. She describes it as constant. She states that she is having a hard time taking a deep breath. She reports that she took her BP at home several times and it was 180s over 115. She denies any history of HTN. She appears in NAD at this time. Skin is PWD. She is aaox4. Bed is locked and in low position. Call light is in reach. Family is at bedside. Will continue to monitor.      Fatuma Newton, RN  11/19/18 8724

## 2018-11-20 NOTE — ED PROVIDER NOTES
EMERGENCY DEPARTMENT ENCOUNTER    CHIEF COMPLAINT  Chief Complaint: CP  History given by: patient, father  History limited by: nothing  Room Number:   PMD: Lambert Browne MD      HPI:  Pt is a 38 y.o. female who presents complaining of constant, sharp, severe, left sided CP that began 3 hours ago while watching TV. Pt states that her CP is worse with deep breathing. Pt denies fever, chills or cough but complains of SOA secondary to pain, fatigue and nausea. Pt denies having similar symptoms previously, recent travel, recent immobilization, recent surgery, history of blood clots, history of cardiac or pulmonary disease. Pt's father states that he gave the pt an antacid without relief of the pt's symptoms but became concerned that the pt's BP was elevated to 186/115.    Duration:  3 hours  Onset: sudden  Timing: constant  Location: left chest  Radiation: none  Quality: sharp  Intensity/Severity: severe  Progression: worsening  Associated Symptoms: nausea, SOA, fatigue  Aggravating Factors: deep breathing  Alleviating Factors: none  Previous Episodes: Pt denies having similar symptoms previously.  Treatment before arrival: Pt's father states that the gave the pt an antacid without relief of her symptoms.    PAST MEDICAL HISTORY  Active Ambulatory Problems     Diagnosis Date Noted   • Intractable vomiting with nausea 2018   • Abdominal pain 2018   • Influenza 2018   • Decreased urination 2018   • LFT elevation 2018     Resolved Ambulatory Problems     Diagnosis Date Noted   • No Resolved Ambulatory Problems     Past Medical History:   Diagnosis Date   • Anxiety    • Chronic pain syndrome    • Depression    • Fatigue    • Fibromyalgia    • Insomnia    • ELIZABETH (obstructive sleep apnea)    • PCOS (polycystic ovarian syndrome)        PAST SURGICAL HISTORY  Past Surgical History:   Procedure Laterality Date   •  SECTION     • CHOLECYSTECTOMY     • HYSTERECTOMY     • TONSILLECTOMY          FAMILY HISTORY  History reviewed. No pertinent family history.    SOCIAL HISTORY  Social History     Socioeconomic History   • Marital status:      Spouse name: Not on file   • Number of children: Not on file   • Years of education: Not on file   • Highest education level: Not on file   Social Needs   • Financial resource strain: Not on file   • Food insecurity - worry: Not on file   • Food insecurity - inability: Not on file   • Transportation needs - medical: Not on file   • Transportation needs - non-medical: Not on file   Occupational History   • Not on file   Tobacco Use   • Smoking status: Never Smoker   Substance and Sexual Activity   • Alcohol use: No   • Drug use: No   • Sexual activity: Not on file   Other Topics Concern   • Not on file   Social History Narrative   • Not on file       ALLERGIES  Amoxicillin; Ceclor [cefaclor]; Codeine; Eggs or egg-derived products; Penicillins; Rocephin [ceftriaxone]; and Sulfa antibiotics    REVIEW OF SYSTEMS  Review of Systems   Constitutional: Positive for fatigue. Negative for fever.   HENT: Negative for sore throat.    Eyes: Negative.    Respiratory: Positive for shortness of breath. Negative for cough.    Cardiovascular: Positive for chest pain.   Gastrointestinal: Positive for nausea. Negative for abdominal pain, diarrhea and vomiting.   Genitourinary: Negative for dysuria.   Musculoskeletal: Negative for neck pain.   Skin: Negative for rash.   Allergic/Immunologic: Negative.    Neurological: Negative for weakness, numbness and headaches.   Hematological: Negative.    Psychiatric/Behavioral: Negative.    All other systems reviewed and are negative.      PHYSICAL EXAM  ED Triage Vitals [11/19/18 2234]   Temp Heart Rate Resp BP SpO2   97.9 °F (36.6 °C) 105 18 140/92 99 %      Temp src Heart Rate Source Patient Position BP Location FiO2 (%)   Tympanic -- -- -- --       Physical Exam   Constitutional: She is oriented to person, place, and time. No distress.    HENT:   Head: Normocephalic and atraumatic.   Eyes: EOM are normal. Pupils are equal, round, and reactive to light.   Neck: Normal range of motion. Neck supple.   Cardiovascular: Normal rate, regular rhythm, normal heart sounds and intact distal pulses.   Pulmonary/Chest: Effort normal and breath sounds normal. No respiratory distress. She exhibits tenderness (left anterior chest wall).   Abdominal: Soft. There is tenderness (mild, right periumbilical tenderness, chronic). There is no rebound and no guarding.   Musculoskeletal: Normal range of motion. She exhibits no edema.   Bilateral calf tenderness   Neurological: She is alert and oriented to person, place, and time. She has normal sensation and normal strength.   Skin: Skin is warm and dry. No rash noted.   Bruising to left anterior tib/fib and left medial thigh   Psychiatric: Mood and affect normal.   Nursing note and vitals reviewed.      LAB RESULTS  Lab Results (last 24 hours)     Procedure Component Value Units Date/Time    Comprehensive Metabolic Panel [[id number]]  (Abnormal) Collected:  11/19/18 2246    Specimen:  Blood from Arm, Right Updated:  11/19/18 2352     Glucose 89 mg/dL      BUN 9 mg/dL      Creatinine 0.63 mg/dL      Sodium 143 mmol/L      Potassium 3.8 mmol/L      Chloride 102 mmol/L      CO2 27.1 mmol/L      Calcium 10.3 mg/dL      Total Protein 7.9 g/dL      Albumin 4.40 g/dL      ALT (SGPT) 110 U/L      AST (SGOT) 65 U/L      Alkaline Phosphatase 161 U/L      Total Bilirubin 0.3 mg/dL      eGFR Non African Amer 107 mL/min/1.73      Globulin 3.5 gm/dL      A/G Ratio 1.3 g/dL      BUN/Creatinine Ratio 14.3     Anion Gap 13.9 mmol/L     Troponin [[id number]]  (Normal) Collected:  11/19/18 2246    Specimen:  Blood from Arm, Right Updated:  11/19/18 2352     Troponin T <0.010 ng/mL     Narrative:       Troponin T Reference Ranges:  Less than 0.03 ng/mL:    Negative for AMI  0.03 to 0.09 ng/mL:      Indeterminant for AMI  Greater than 0.09  ng/mL: Positive for AMI    CBC & Differential [[id number]] Collected:  11/19/18 2247    Specimen:  Blood Updated:  11/19/18 2339    Narrative:       The following orders were created for panel order CBC & Differential.  Procedure                               Abnormality         Status                     ---------                               -----------         ------                     CBC Auto Differential[[id number]]        Normal              Final result                 Please view results for these tests on the individual orders.    D-dimer, Quantitative [[id number]]  (Normal) Collected:  11/19/18 2247    Specimen:  Blood from Arm, Right Updated:  11/19/18 2349     D-Dimer, Quantitative 0.35 MCGFEU/mL     Narrative:       The Stago D-Dimer test used in conjunction with a clinical pretest probability (PTP) assessment model, has been approved by the FDA to rule out the presence of venous thromboembolism (VTE) in outpatients suspected of deep venous thrombosis (DVT) or pulmonary embolism (PE).     CBC Auto Differential [[id number]]  (Normal) Collected:  11/19/18 2247    Specimen:  Blood from Arm, Right Updated:  11/19/18 2339     WBC 7.37 10*3/mm3      RBC 4.72 10*6/mm3      Hemoglobin 14.6 g/dL      Hematocrit 42.6 %      MCV 90.3 fL      MCH 30.9 pg      MCHC 34.3 g/dL      RDW 12.7 %      RDW-SD 41.7 fl      MPV 10.2 fL      Platelets 283 10*3/mm3      Neutrophil % 60.5 %      Lymphocyte % 27.5 %      Monocyte % 9.6 %      Eosinophil % 2.0 %      Basophil % 0.4 %      Immature Grans % 0.1 %      Neutrophils, Absolute 4.45 10*3/mm3      Lymphocytes, Absolute 2.03 10*3/mm3      Monocytes, Absolute 0.71 10*3/mm3      Eosinophils, Absolute 0.15 10*3/mm3      Basophils, Absolute 0.03 10*3/mm3      Immature Grans, Absolute 0.01 10*3/mm3           I ordered the above labs and reviewed the results    RADIOLOGY  XR Chest 2 View   Final Result   1. No active disease.       This report was finalized on 11/20/2018 12:00  AM by Reuben Verma M.D.               I ordered the above noted radiological studies. Interpreted by radiologist. Reviewed by me in PACS.       PROCEDURES  Procedures  EKG          EKG time: 2238  Rhythm/Rate: NSR, 85  P waves and CO: normal  QRS, axis: normal   ST and T waves: normal     Interpreted Contemporaneously by me, independently viewed  No prior for comparison    PROGRESS AND CONSULTS     2326- Notified pt and family of the pt's EKG results. Discussed the plan to order lab and imaging studies for further evaluation of her symptoms. Pt understands and agrees with the plan, all questions answered.    2332- Ordered blood work, D-Dimer, troponin and CXR for further evaluation.    12:08 AM  Latest vital signs   BP- 120/77 HR- 76 Temp- 97.9 °F (36.6 °C) (Tympanic) O2 sat- 94%    0011- Rechecked pt. Pt is resting comfortably. XZ=917/85. Notified pt and family of the pt's unremarkable lab and imaging results, including the pt's chronically elevated LFTs, D-Dimer and Troponin. D/w pt and family that the pt's symptoms are c/w pleurisy or costochondritis. Discussed the plan to discharge the pt home with instructions to take ibuprofen or Aleve as needed for pain. I instructed the pt to follow up with Dr. Browne (PCP). Pt understands and agrees with the plan, all questions answered.    MEDICAL DECISION MAKING  Results were reviewed/discussed with the patient and they were also made aware of online access. Pt also made aware that some labs, such as cultures, will not be resulted during ER visit and follow up with PMD is necessary.     MDM  Number of Diagnoses or Management Options  Costochondritis:      Amount and/or Complexity of Data Reviewed  Clinical lab tests: ordered and reviewed (D-Dimer=0.35, troponin=<0.010)  Tests in the radiology section of CPT®: ordered and reviewed (CXR shows nothing acute)  Tests in the medicine section of CPT®: ordered and reviewed (See EKG procedure note)  Decide to obtain previous  medical records or to obtain history from someone other than the patient: yes  Obtain history from someone other than the patient: yes (father)  Independent visualization of images, tracings, or specimens: yes    Patient Progress  Patient progress: stable         DIAGNOSIS  Final diagnoses:   Costochondritis   Left sided chest pain       DISPOSITION  DISCHARGE    Patient discharged in stable condition.    Reviewed implications of results, diagnosis, meds, responsibility to follow up, warning signs and symptoms of possible worsening, potential complications and reasons to return to ER.    Patient/Family voiced understanding of above instructions.    Discussed plan for discharge, as there is no emergent indication for admission. Patient referred to primary care provider for BP management due to today's BP. Pt/family is agreeable and understands need for follow up and repeat testing.  Pt is aware that discharge does not mean that nothing is wrong but it indicates no emergency is present that requires admission and they must continue care with follow-up as given below or physician of their choice.     FOLLOW-UP  Lambert Browne MD  [address]  [facility] [id number]  [place] [id number] [phone]    Call   As needed, If symptoms worsen         Medication List      Changed    estradiol 1 MG tablet  Commonly known as:  ESTRACE  What changed:  Another medication with the same name was removed. Continue   taking this medication, and follow the directions you see here.        Stop    ARIPiprazole 2 MG tablet  Commonly known as:  ABILIFY     lamoTRIgine 25 MG tablet  Commonly known as:  LaMICtal     promethazine 12.5 MG suppository  Commonly known as:  PHENERGAN     TAMIFLU 75 MG capsule  Generic drug:  oseltamivir              Latest Documented Vital Signs:  As of 12:17 AM  BP- 120/77 HR- 76 Temp- 97.9 °F (36.6 °C) (Tympanic) O2 sat- 94%    --  Documentation assistance provided by jose Rios for Dr. Ramírez.   Information recorded by the scribe was done at my direction and has been verified and validated by me.     Agnieszka Rios  11/20/18 0017       Jonathan Ramírez MD  11/20/18 0157

## 2018-12-06 ENCOUNTER — DOCUMENTATION (OUTPATIENT)
Dept: PHYSICAL THERAPY | Facility: HOSPITAL | Age: 38
End: 2018-12-06

## 2018-12-06 NOTE — THERAPY DISCHARGE NOTE
Outpatient Physical Therapy Ortho Discharge Summary       Patient Name: Evangelina Mtz  : 1980  MRN: [id number]  Today's Date: 2018      Visit Date: 2018    Visit Dx:  No diagnosis found.    Patient Active Problem List   Diagnosis   • Intractable vomiting with nausea   • Abdominal pain   • Influenza   • Decreased urination   • LFT elevation        Past Medical History:   Diagnosis Date   • Anxiety    • Chronic pain syndrome    • Depression    • Fatigue    • Fibromyalgia    • Insomnia    • ELIZABETH (obstructive sleep apnea)    • PCOS (polycystic ovarian syndrome)         Past Surgical History:   Procedure Laterality Date   •  SECTION     • CHOLECYSTECTOMY     • HYSTERECTOMY     • TONSILLECTOMY         PT OP Goals     Row Name 18 1500          PT Short Term Goals    STG Date to Achieve  10/26/18  -MP     STG 1  Evangelina skilled agua therapy and is able to tolerate a full session without great difficulty.  -MP     STG 1 Progress  Not Met  -MP     STG 2  She is independent with aqua therapy exercises.  -MP     STG 2 Progress  Not Met  -MP     STG 3  PSFS disability improves to 50%.  -MP     STG 3 Progress  Not Met  -MP        Long Term Goals    LTG Date to Achieve  18  -MP     LTG 1  Evangelina is able to perform standing lumbar spine AROM, cardinal planes, pain free.  -MP     LTG 1 Progress  Not Met  -MP     LTG 2  Evangelina is independent with a complete HEP and general self care education.  -MP     LTG 2 Progress  Not Met  -MP     LTG 3  PSFS disability is below 40%.  -MP     LTG 3 Progress  Not Met  -MP       User Key  (r) = Recorded By, (t) = Taken By, (c) = Cosigned By    Initials Name Provider Type    Helder Delacruz, PT Physical Therapist        Time Calculation:      Therapy Suggested Charges     Code   Minutes Charges    None         OP PT Discharge Summary  Date of Discharge: 18  Reason for Discharge: Unable to participate  Outcomes Achieved: Unable to make functional  progress toward goals at this time  Discharge Destination: Unknown  Discharge Instructions/Additional Comments: Ms. Mtz did not come for treatment after her initial evaluation.      Helder Barker, PT  12/6/2018

## 2019-03-12 ENCOUNTER — OFFICE VISIT (OUTPATIENT)
Dept: SURGERY | Facility: CLINIC | Age: 39
End: 2019-03-12

## 2019-03-12 VITALS — HEART RATE: 82 BPM | WEIGHT: 204 LBS | BODY MASS INDEX: 33.99 KG/M2 | HEIGHT: 65 IN | OXYGEN SATURATION: 98 %

## 2019-03-12 DIAGNOSIS — K64.5 THROMBOSED EXTERNAL HEMORRHOID: Primary | ICD-10-CM

## 2019-03-12 PROCEDURE — 46083 INC THROMBOSED HROID XTRNL: CPT | Performed by: SURGERY

## 2019-05-17 ENCOUNTER — APPOINTMENT (OUTPATIENT)
Dept: GENERAL RADIOLOGY | Facility: HOSPITAL | Age: 39
End: 2019-05-17

## 2019-05-17 ENCOUNTER — HOSPITAL ENCOUNTER (EMERGENCY)
Facility: HOSPITAL | Age: 39
Discharge: HOME | End: 2019-05-17
Attending: EMERGENCY MEDICINE | Admitting: EMERGENCY MEDICINE

## 2019-05-17 VITALS
TEMPERATURE: 97.8 F | DIASTOLIC BLOOD PRESSURE: 76 MMHG | HEIGHT: 65 IN | OXYGEN SATURATION: 96 % | BODY MASS INDEX: 33.99 KG/M2 | RESPIRATION RATE: 18 BRPM | WEIGHT: 204 LBS | HEART RATE: 106 BPM | SYSTOLIC BLOOD PRESSURE: 129 MMHG

## 2019-05-17 DIAGNOSIS — R11.2 NAUSEA AND VOMITING, INTRACTABILITY OF VOMITING NOT SPECIFIED, UNSPECIFIED VOMITING TYPE: Primary | ICD-10-CM

## 2019-05-17 DIAGNOSIS — R19.7 DIARRHEA, UNSPECIFIED TYPE: ICD-10-CM

## 2019-05-17 LAB
ALBUMIN SERPL-MCNC: 4 G/DL (ref 3.5–5.2)
ALBUMIN/GLOB SERPL: 1.2 G/DL
ALP SERPL-CCNC: 162 U/L (ref 39–117)
ALT SERPL W P-5'-P-CCNC: 61 U/L (ref 1–33)
ANION GAP SERPL CALCULATED.3IONS-SCNC: 16 MMOL/L
AST SERPL-CCNC: 38 U/L (ref 1–32)
BACTERIA UR QL AUTO: ABNORMAL /HPF
BASOPHILS # BLD AUTO: 0.05 10*3/MM3 (ref 0–0.2)
BASOPHILS NFR BLD AUTO: 0.8 % (ref 0–1.5)
BILIRUB SERPL-MCNC: <0.2 MG/DL (ref 0.2–1.2)
BILIRUB UR QL STRIP: NEGATIVE
BUN BLD-MCNC: 11 MG/DL (ref 6–20)
BUN/CREAT SERPL: 15.9 (ref 7–25)
CALCIUM SPEC-SCNC: 9.5 MG/DL (ref 8.6–10.5)
CHLORIDE SERPL-SCNC: 100 MMOL/L (ref 98–107)
CLARITY UR: CLEAR
CO2 SERPL-SCNC: 22 MMOL/L (ref 22–29)
COLOR UR: YELLOW
CREAT BLD-MCNC: 0.69 MG/DL (ref 0.57–1)
DEPRECATED RDW RBC AUTO: 41 FL (ref 37–54)
EOSINOPHIL # BLD AUTO: 0.15 10*3/MM3 (ref 0–0.4)
EOSINOPHIL NFR BLD AUTO: 2.4 % (ref 0.3–6.2)
ERYTHROCYTE [DISTWIDTH] IN BLOOD BY AUTOMATED COUNT: 12.2 % (ref 12.3–15.4)
GFR SERPL CREATININE-BSD FRML MDRD: 96 ML/MIN/1.73
GLOBULIN UR ELPH-MCNC: 3.4 GM/DL
GLUCOSE BLD-MCNC: 116 MG/DL (ref 65–99)
GLUCOSE UR STRIP-MCNC: NEGATIVE MG/DL
HCT VFR BLD AUTO: 42.7 % (ref 34–46.6)
HGB BLD-MCNC: 14.1 G/DL (ref 12–15.9)
HGB UR QL STRIP.AUTO: NEGATIVE
HOLD SPECIMEN: NORMAL
HOLD SPECIMEN: NORMAL
HYALINE CASTS UR QL AUTO: ABNORMAL /LPF
IMM GRANULOCYTES # BLD AUTO: 0.01 10*3/MM3 (ref 0–0.05)
IMM GRANULOCYTES NFR BLD AUTO: 0.2 % (ref 0–0.5)
KETONES UR QL STRIP: NEGATIVE
LEUKOCYTE ESTERASE UR QL STRIP.AUTO: ABNORMAL
LIPASE SERPL-CCNC: 33 U/L (ref 13–60)
LYMPHOCYTES # BLD AUTO: 2.42 10*3/MM3 (ref 0.7–3.1)
LYMPHOCYTES NFR BLD AUTO: 38 % (ref 19.6–45.3)
MCH RBC QN AUTO: 30.3 PG (ref 26.6–33)
MCHC RBC AUTO-ENTMCNC: 33 G/DL (ref 31.5–35.7)
MCV RBC AUTO: 91.6 FL (ref 79–97)
MONOCYTES # BLD AUTO: 0.57 10*3/MM3 (ref 0.1–0.9)
MONOCYTES NFR BLD AUTO: 8.9 % (ref 5–12)
NEUTROPHILS # BLD AUTO: 3.17 10*3/MM3 (ref 1.7–7)
NEUTROPHILS NFR BLD AUTO: 49.7 % (ref 42.7–76)
NITRITE UR QL STRIP: NEGATIVE
NRBC BLD AUTO-RTO: 0 /100 WBC (ref 0–0.2)
PH UR STRIP.AUTO: 7 [PH] (ref 5–8)
PLATELET # BLD AUTO: 282 10*3/MM3 (ref 140–450)
PMV BLD AUTO: 9.9 FL (ref 6–12)
POTASSIUM BLD-SCNC: 3.7 MMOL/L (ref 3.5–5.2)
PROT SERPL-MCNC: 7.4 G/DL (ref 6–8.5)
PROT UR QL STRIP: NEGATIVE
RBC # BLD AUTO: 4.66 10*6/MM3 (ref 3.77–5.28)
RBC # UR: ABNORMAL /HPF
REF LAB TEST METHOD: ABNORMAL
SODIUM BLD-SCNC: 138 MMOL/L (ref 136–145)
SP GR UR STRIP: 1.01 (ref 1–1.03)
SQUAMOUS #/AREA URNS HPF: ABNORMAL /HPF
UROBILINOGEN UR QL STRIP: ABNORMAL
WBC NRBC COR # BLD: 6.37 10*3/MM3 (ref 3.4–10.8)
WBC UR QL AUTO: ABNORMAL /HPF
WHOLE BLOOD HOLD SPECIMEN: NORMAL
WHOLE BLOOD HOLD SPECIMEN: NORMAL

## 2019-05-17 PROCEDURE — 25010000002 PROMETHAZINE PER 50 MG: Performed by: EMERGENCY MEDICINE

## 2019-05-17 PROCEDURE — 83690 ASSAY OF LIPASE: CPT

## 2019-05-17 PROCEDURE — 71046 X-RAY EXAM CHEST 2 VIEWS: CPT

## 2019-05-17 PROCEDURE — 96375 TX/PRO/DX INJ NEW DRUG ADDON: CPT

## 2019-05-17 PROCEDURE — 80053 COMPREHEN METABOLIC PANEL: CPT

## 2019-05-17 PROCEDURE — 99283 EMERGENCY DEPT VISIT LOW MDM: CPT

## 2019-05-17 PROCEDURE — 25010000002 ONDANSETRON PER 1 MG: Performed by: EMERGENCY MEDICINE

## 2019-05-17 PROCEDURE — 96374 THER/PROPH/DIAG INJ IV PUSH: CPT

## 2019-05-17 PROCEDURE — 81001 URINALYSIS AUTO W/SCOPE: CPT | Performed by: EMERGENCY MEDICINE

## 2019-05-17 PROCEDURE — 85025 COMPLETE CBC W/AUTO DIFF WBC: CPT

## 2019-05-17 RX ORDER — ONDANSETRON 2 MG/ML
4 INJECTION INTRAMUSCULAR; INTRAVENOUS ONCE
Status: DISCONTINUED | OUTPATIENT
Start: 2019-05-17 | End: 2019-05-17

## 2019-05-17 RX ORDER — SODIUM CHLORIDE 0.9 % (FLUSH) 0.9 %
10 SYRINGE (ML) INJECTION AS NEEDED
Status: DISCONTINUED | OUTPATIENT
Start: 2019-05-17 | End: 2019-05-17 | Stop reason: HOSPADM

## 2019-05-17 RX ORDER — PROMETHAZINE HYDROCHLORIDE 25 MG/ML
12.5 INJECTION, SOLUTION INTRAMUSCULAR; INTRAVENOUS ONCE
Status: COMPLETED | OUTPATIENT
Start: 2019-05-17 | End: 2019-05-17

## 2019-05-17 RX ORDER — PROMETHAZINE HYDROCHLORIDE 25 MG/1
25 TABLET ORAL EVERY 6 HOURS PRN
Qty: 10 TABLET | Refills: 0 | Status: SHIPPED | OUTPATIENT
Start: 2019-05-17 | End: 2019-10-28

## 2019-05-17 RX ORDER — ONDANSETRON 2 MG/ML
4 INJECTION INTRAMUSCULAR; INTRAVENOUS ONCE
Status: COMPLETED | OUTPATIENT
Start: 2019-05-17 | End: 2019-05-17

## 2019-05-17 RX ADMIN — SODIUM CHLORIDE 1000 ML: 9 INJECTION, SOLUTION INTRAVENOUS at 17:45

## 2019-05-17 RX ADMIN — ONDANSETRON HYDROCHLORIDE 4 MG: 2 SOLUTION INTRAMUSCULAR; INTRAVENOUS at 17:47

## 2019-05-17 RX ADMIN — PROMETHAZINE HYDROCHLORIDE 12.5 MG: 25 INJECTION INTRAMUSCULAR; INTRAVENOUS at 18:26

## 2019-10-28 ENCOUNTER — HOSPITAL ENCOUNTER (EMERGENCY)
Facility: HOSPITAL | Age: 39
Discharge: HOME | End: 2019-10-28
Attending: EMERGENCY MEDICINE | Admitting: EMERGENCY MEDICINE

## 2019-10-28 VITALS
TEMPERATURE: 98.1 F | SYSTOLIC BLOOD PRESSURE: 105 MMHG | DIASTOLIC BLOOD PRESSURE: 61 MMHG | OXYGEN SATURATION: 90 % | HEIGHT: 65 IN | WEIGHT: 195 LBS | BODY MASS INDEX: 32.49 KG/M2 | HEART RATE: 85 BPM | RESPIRATION RATE: 18 BRPM

## 2019-10-28 DIAGNOSIS — M35.3 POLYMYALGIA (HCC): ICD-10-CM

## 2019-10-28 DIAGNOSIS — R11.2 NON-INTRACTABLE VOMITING WITH NAUSEA, UNSPECIFIED VOMITING TYPE: Primary | ICD-10-CM

## 2019-10-28 DIAGNOSIS — R10.10 UPPER ABDOMINAL PAIN: ICD-10-CM

## 2019-10-28 LAB
ALBUMIN SERPL-MCNC: 4.3 G/DL (ref 3.5–5.2)
ALBUMIN/GLOB SERPL: 1.3 G/DL
ALP SERPL-CCNC: 158 U/L (ref 39–117)
ALT SERPL W P-5'-P-CCNC: 30 U/L (ref 1–33)
ANION GAP SERPL CALCULATED.3IONS-SCNC: 8.3 MMOL/L (ref 5–15)
AST SERPL-CCNC: 19 U/L (ref 1–32)
BACTERIA UR QL AUTO: ABNORMAL /HPF
BASOPHILS # BLD AUTO: 0.04 10*3/MM3 (ref 0–0.2)
BASOPHILS NFR BLD AUTO: 0.7 % (ref 0–1.5)
BILIRUB SERPL-MCNC: 0.3 MG/DL (ref 0.2–1.2)
BILIRUB UR QL STRIP: NEGATIVE
BUN BLD-MCNC: 7 MG/DL (ref 6–20)
BUN/CREAT SERPL: 10 (ref 7–25)
CALCIUM SPEC-SCNC: 9.5 MG/DL (ref 8.6–10.5)
CHLORIDE SERPL-SCNC: 98 MMOL/L (ref 98–107)
CLARITY UR: ABNORMAL
CO2 SERPL-SCNC: 29.7 MMOL/L (ref 22–29)
COLOR UR: YELLOW
CREAT BLD-MCNC: 0.7 MG/DL (ref 0.57–1)
DEPRECATED RDW RBC AUTO: 43.3 FL (ref 37–54)
EOSINOPHIL # BLD AUTO: 0.1 10*3/MM3 (ref 0–0.4)
EOSINOPHIL NFR BLD AUTO: 1.8 % (ref 0.3–6.2)
ERYTHROCYTE [DISTWIDTH] IN BLOOD BY AUTOMATED COUNT: 13.1 % (ref 12.3–15.4)
GFR SERPL CREATININE-BSD FRML MDRD: 94 ML/MIN/1.73
GLOBULIN UR ELPH-MCNC: 3.2 GM/DL
GLUCOSE BLD-MCNC: 117 MG/DL (ref 65–99)
GLUCOSE UR STRIP-MCNC: NEGATIVE MG/DL
HCT VFR BLD AUTO: 42.4 % (ref 34–46.6)
HGB BLD-MCNC: 14.3 G/DL (ref 12–15.9)
HGB UR QL STRIP.AUTO: ABNORMAL
HOLD SPECIMEN: NORMAL
HOLD SPECIMEN: NORMAL
HYALINE CASTS UR QL AUTO: ABNORMAL /LPF
IMM GRANULOCYTES # BLD AUTO: 0 10*3/MM3 (ref 0–0.05)
IMM GRANULOCYTES NFR BLD AUTO: 0 % (ref 0–0.5)
KETONES UR QL STRIP: NEGATIVE
LEUKOCYTE ESTERASE UR QL STRIP.AUTO: ABNORMAL
LIPASE SERPL-CCNC: 20 U/L (ref 13–60)
LYMPHOCYTES # BLD AUTO: 1.46 10*3/MM3 (ref 0.7–3.1)
LYMPHOCYTES NFR BLD AUTO: 26.4 % (ref 19.6–45.3)
MCH RBC QN AUTO: 30.4 PG (ref 26.6–33)
MCHC RBC AUTO-ENTMCNC: 33.7 G/DL (ref 31.5–35.7)
MCV RBC AUTO: 90 FL (ref 79–97)
MONOCYTES # BLD AUTO: 0.67 10*3/MM3 (ref 0.1–0.9)
MONOCYTES NFR BLD AUTO: 12.1 % (ref 5–12)
NEUTROPHILS # BLD AUTO: 3.27 10*3/MM3 (ref 1.7–7)
NEUTROPHILS NFR BLD AUTO: 59 % (ref 42.7–76)
NITRITE UR QL STRIP: NEGATIVE
NRBC BLD AUTO-RTO: 0 /100 WBC (ref 0–0.2)
PH UR STRIP.AUTO: 6 [PH] (ref 5–8)
PLATELET # BLD AUTO: 248 10*3/MM3 (ref 140–450)
PMV BLD AUTO: 9.3 FL (ref 6–12)
POTASSIUM BLD-SCNC: 3.6 MMOL/L (ref 3.5–5.2)
PROT SERPL-MCNC: 7.5 G/DL (ref 6–8.5)
PROT UR QL STRIP: NEGATIVE
RBC # BLD AUTO: 4.71 10*6/MM3 (ref 3.77–5.28)
RBC # UR: ABNORMAL /HPF
REF LAB TEST METHOD: ABNORMAL
SODIUM BLD-SCNC: 136 MMOL/L (ref 136–145)
SP GR UR STRIP: 1.01 (ref 1–1.03)
SQUAMOUS #/AREA URNS HPF: ABNORMAL /HPF
UROBILINOGEN UR QL STRIP: ABNORMAL
WBC NRBC COR # BLD: 5.54 10*3/MM3 (ref 3.4–10.8)
WBC UR QL AUTO: ABNORMAL /HPF
WHOLE BLOOD HOLD SPECIMEN: NORMAL
WHOLE BLOOD HOLD SPECIMEN: NORMAL

## 2019-10-28 PROCEDURE — 80053 COMPREHEN METABOLIC PANEL: CPT | Performed by: EMERGENCY MEDICINE

## 2019-10-28 PROCEDURE — 25010000002 HYDROMORPHONE PER 4 MG: Performed by: EMERGENCY MEDICINE

## 2019-10-28 PROCEDURE — 96374 THER/PROPH/DIAG INJ IV PUSH: CPT

## 2019-10-28 PROCEDURE — 83690 ASSAY OF LIPASE: CPT | Performed by: EMERGENCY MEDICINE

## 2019-10-28 PROCEDURE — 96375 TX/PRO/DX INJ NEW DRUG ADDON: CPT

## 2019-10-28 PROCEDURE — 81001 URINALYSIS AUTO W/SCOPE: CPT | Performed by: EMERGENCY MEDICINE

## 2019-10-28 PROCEDURE — 25010000002 ONDANSETRON PER 1 MG: Performed by: EMERGENCY MEDICINE

## 2019-10-28 PROCEDURE — 25010000002 PROMETHAZINE PER 50 MG: Performed by: EMERGENCY MEDICINE

## 2019-10-28 PROCEDURE — 85025 COMPLETE CBC W/AUTO DIFF WBC: CPT | Performed by: EMERGENCY MEDICINE

## 2019-10-28 PROCEDURE — 99284 EMERGENCY DEPT VISIT MOD MDM: CPT

## 2019-10-28 PROCEDURE — 99283 EMERGENCY DEPT VISIT LOW MDM: CPT

## 2019-10-28 PROCEDURE — 96376 TX/PRO/DX INJ SAME DRUG ADON: CPT

## 2019-10-28 RX ORDER — ACETAMINOPHEN 325 MG/1
650 TABLET ORAL DAILY PRN
COMMUNITY

## 2019-10-28 RX ORDER — PROMETHAZINE HYDROCHLORIDE 25 MG/ML
6.25 INJECTION, SOLUTION INTRAMUSCULAR; INTRAVENOUS ONCE
Status: COMPLETED | OUTPATIENT
Start: 2019-10-28 | End: 2019-10-28

## 2019-10-28 RX ORDER — ONDANSETRON 2 MG/ML
4 INJECTION INTRAMUSCULAR; INTRAVENOUS ONCE
Status: COMPLETED | OUTPATIENT
Start: 2019-10-28 | End: 2019-10-28

## 2019-10-28 RX ORDER — HYDROMORPHONE HYDROCHLORIDE 1 MG/ML
0.5 INJECTION, SOLUTION INTRAMUSCULAR; INTRAVENOUS; SUBCUTANEOUS ONCE
Status: COMPLETED | OUTPATIENT
Start: 2019-10-28 | End: 2019-10-28

## 2019-10-28 RX ORDER — SODIUM CHLORIDE 0.9 % (FLUSH) 0.9 %
10 SYRINGE (ML) INJECTION AS NEEDED
Status: DISCONTINUED | OUTPATIENT
Start: 2019-10-28 | End: 2019-10-29 | Stop reason: HOSPADM

## 2019-10-28 RX ORDER — PROMETHAZINE HYDROCHLORIDE 12.5 MG/1
12.5 TABLET ORAL EVERY 6 HOURS PRN
Qty: 10 TABLET | Refills: 0 | OUTPATIENT
Start: 2019-10-28 | End: 2021-02-22

## 2019-10-28 RX ADMIN — HYDROMORPHONE HYDROCHLORIDE 0.5 MG: 1 INJECTION, SOLUTION INTRAMUSCULAR; INTRAVENOUS; SUBCUTANEOUS at 20:54

## 2019-10-28 RX ADMIN — ONDANSETRON 4 MG: 2 INJECTION INTRAMUSCULAR; INTRAVENOUS at 19:40

## 2019-10-28 RX ADMIN — HYDROMORPHONE HYDROCHLORIDE 0.5 MG: 1 INJECTION, SOLUTION INTRAMUSCULAR; INTRAVENOUS; SUBCUTANEOUS at 19:40

## 2019-10-28 RX ADMIN — PROMETHAZINE HYDROCHLORIDE 6.25 MG: 25 INJECTION INTRAMUSCULAR; INTRAVENOUS at 20:10

## 2019-10-28 RX ADMIN — SODIUM CHLORIDE, POTASSIUM CHLORIDE, SODIUM LACTATE AND CALCIUM CHLORIDE 1000 ML: 600; 310; 30; 20 INJECTION, SOLUTION INTRAVENOUS at 19:40

## 2019-12-23 ENCOUNTER — HOSPITAL ENCOUNTER (EMERGENCY)
Facility: HOSPITAL | Age: 39
Discharge: HOME | End: 2019-12-23
Attending: EMERGENCY MEDICINE | Admitting: EMERGENCY MEDICINE

## 2019-12-23 VITALS
HEIGHT: 65 IN | SYSTOLIC BLOOD PRESSURE: 130 MMHG | DIASTOLIC BLOOD PRESSURE: 67 MMHG | TEMPERATURE: 97.7 F | HEART RATE: 96 BPM | OXYGEN SATURATION: 99 % | BODY MASS INDEX: 32.45 KG/M2 | RESPIRATION RATE: 16 BRPM

## 2019-12-23 DIAGNOSIS — M32.9 SYSTEMIC LUPUS ERYTHEMATOSUS, UNSPECIFIED SLE TYPE, UNSPECIFIED ORGAN INVOLVEMENT STATUS (HCC): Primary | ICD-10-CM

## 2019-12-23 PROCEDURE — 99282 EMERGENCY DEPT VISIT SF MDM: CPT

## 2019-12-23 RX ORDER — ONDANSETRON 4 MG/1
4 TABLET, ORALLY DISINTEGRATING ORAL EVERY 8 HOURS PRN
Qty: 15 TABLET | Refills: 0 | Status: SHIPPED | OUTPATIENT
Start: 2019-12-23 | End: 2019-12-28

## 2019-12-23 RX ORDER — METHYLPREDNISOLONE 4 MG/1
TABLET ORAL
Qty: 21 TABLET | Refills: 0 | Status: SHIPPED | OUTPATIENT
Start: 2019-12-23 | End: 2021-10-12

## 2019-12-23 RX ORDER — HYDROXYZINE PAMOATE 50 MG/1
50 CAPSULE ORAL 3 TIMES DAILY PRN
Qty: 9 CAPSULE | Refills: 0 | Status: SHIPPED | OUTPATIENT
Start: 2019-12-23 | End: 2019-12-26

## 2019-12-23 NOTE — ED PROVIDER NOTES
EMERGENCY DEPARTMENT ENCOUNTER    Room Number:    Date of encounter:  2019  PCP: Varsha Little  Historian: Patient      HPI:  Chief Complaint: Joint pain  A complete HPI/ROS/PMH/PSH/SH/FH are unobtainable due to: None    Context: Evangelina Mtz is a 39 y.o. female who presents to the ED c/o generalized joint pain.  This started yesterday.  Is constant.  Worse by nothing.  Improved by nothing.  She has tried Aleve for this that her rheumatologist prescribed.  States that she has a history of psoriatic arthritis as well as lupus that was diagnosed approximately 1 year ago.  She reports associated fatigue.  No fevers.  This feels just like prior flares.  Reports having associated anxiety.  She also states that she is recently  from her  which is adding to her associated pain.      PAST MEDICAL HISTORY  Active Ambulatory Problems     Diagnosis Date Noted   • Intractable vomiting with nausea 2018   • Abdominal pain 2018   • Influenza 2018   • Decreased urination 2018   • LFT elevation 2018     Resolved Ambulatory Problems     Diagnosis Date Noted   • No Resolved Ambulatory Problems     Past Medical History:   Diagnosis Date   • Anxiety    • C. difficile colitis    • Chronic pain syndrome    • Depression    • Fatigue    • Fibromyalgia    • Insomnia    • Lupus (CMS/HCC)    • ELIZABETH (obstructive sleep apnea)    • PCOS (polycystic ovarian syndrome)    • Psoriatic arthritis (CMS/HCC)          PAST SURGICAL HISTORY  Past Surgical History:   Procedure Laterality Date   •  SECTION N/A    • INCISION AND DRAINAGE TRUNK N/A 2019    In-office Procedure: Incision [facility] of thrombosed [facility]   • LAPAROSCOPIC CHOLECYSTECTOMY N/A 2007    [facility]   • TONSILLECTOMY Bilateral    • TOTAL ABDOMINAL HYSTERECTOMY WITH SALPINGO OOPHORECTOMY Bilateral    • TYMPANOPLASTY Bilateral          FAMILY  HISTORY  Family History   Adopted: Yes         SOCIAL HISTORY  Social History     Socioeconomic History   • Marital status:      Spouse name: Not on file   • Number of children: Not on file   • Years of education: Not on file   • Highest education level: Not on file   Tobacco Use   • Smoking status: Never Smoker   • Smokeless tobacco: Never Used   Substance and Sexual Activity   • Alcohol use: No   • Drug use: No   • Sexual activity: Defer         ALLERGIES  Amoxicillin; Ceclor [cefaclor]; Codeine; Eggs or egg-derived products; Penicillins; Rocephin [ceftriaxone]; and Sulfa antibiotics        REVIEW OF SYSTEMS  Review of Systems     All systems reviewed and negative except for those discussed in HPI.       PHYSICAL EXAM    I have reviewed the triage vital signs and nursing notes.    ED Triage Vitals   Temp Heart Rate Resp BP SpO2   12/23/19 1519 12/23/19 1519 12/23/19 1519 12/23/19 1624 12/23/19 1519   97.7 °F (36.5 °C) 111 16 157/90 99 %      Temp src Heart Rate Source Patient Position BP Location FiO2 (%)   12/23/19 1519 12/23/19 1519 12/23/19 1624 12/23/19 1624 --   Tympanic Monitor Sitting Right arm        Physical Exam  GENERAL: not distressed  HENT: nares patent  EYES: no scleral icterus  CV: regular rhythm, regular rate  RESPIRATORY: normal effort, clear to auscultation bilaterally  ABDOMEN: soft nontender  MUSCULOSKELETAL: no deformity, no overlying redness or warmth to her shoulders, elbows, wrists, knees, ankles bilaterally.  Patient has mild pain with passive range of motion to all of the aforementioned joints  NEURO: alert, moves all extremities, follows commands  SKIN: warm, dry, no rash        LAB RESULTS  No results found for this or any previous visit (from the past 24 hour(s)).    Ordered the above labs and independently reviewed the results.        RADIOLOGY  No Radiology Exams Resulted Within Past 24 Hours    I ordered the above noted radiological studies. Reviewed by me and discussed with  radiologist.  See dictation for official radiology interpretation.      PROCEDURES    Procedures      MEDICATIONS GIVEN IN ER    Medications - No data to display      PROGRESS, DATA ANALYSIS, CONSULTS, AND MEDICAL DECISION MAKING    All labs have been independently reviewed by me.  All radiology studies have been reviewed by me and discussed with radiologist dictating the report.   EKG's independently viewed and interpreted by me.  Discussion below represents my analysis of pertinent findings related to patient's condition, differential diagnosis, treatment plan and final disposition.    Patient presents with her typical lupus flare pain.  I believe that there is risks of starting her on narcotics and there is little to no data that supports narcotic pain medicine over other therapies for chronic pain syndromes.  Given her rheumatologic condition, believe that a short burst of steroids would be reasonable in this patient as she really wants to be feeling better for [place].  I discussed with her the risks and side effects of steroids.  She is aware but is willing to try this for a brief period of time.  She has no fevers and is systemically well-appearing.       On medical chart review, patient was seen on 10/28/2019 for vomiting.  She was also having a lupus flareup at that time.  She is a history of hysterectomy.    AS OF 5:52 PM VITALS:    BP - 157/90  HR - 112  TEMP - 97.7 °F (36.5 °C) (Tympanic)  O2 SATS - 99%        DIAGNOSIS  Final diagnoses:   Acute flare of systemic lupus erythematosus, unspecified SLE type, unspecified organ involvement status (CMS/[facility])         DISPOSITION  DISCHARGE    FOLLOW-UP  Varsha Little  [address] [place] [address]  [phone]    Schedule an appointment as soon as possible for a visit in 1 day  If symptoms worsen         Medication List      New Prescriptions    hydrOXYzine pamoate 50 MG capsule  Commonly known as:  VISTARIL  Take 1 capsule by  mouth 3 (Three) Times a Day As Needed for Anxiety for up   to 3 days.     methylPREDNISolone 4 MG tablet  Commonly known as:  MEDROL (LAINE)  Take as directed on package instructions.     ondansetron ODT 4 MG disintegrating tablet  Commonly known as:  ZOFRAN-ODT  Take 1 tablet by mouth Every 8 (Eight) Hours As Needed for Nausea or   Vomiting for up to 5 days.                   Carlos Quintanilla II, MD  12/23/19 5408

## 2019-12-23 NOTE — ED NOTES
Pt reports joint pain, nausea, fatigue and anxiety that started yesterday. Pt states hx lupus and psoriatic arthritis. Pt taking humira x 2 weeks ago.         Silvia Lamar, RN  12/23/19 8622

## 2021-02-22 ENCOUNTER — APPOINTMENT (OUTPATIENT)
Dept: GENERAL RADIOLOGY | Facility: HOSPITAL | Age: 41
End: 2021-02-22

## 2021-02-22 ENCOUNTER — HOSPITAL ENCOUNTER (EMERGENCY)
Facility: HOSPITAL | Age: 41
Discharge: HOME | End: 2021-02-22
Attending: EMERGENCY MEDICINE | Admitting: EMERGENCY MEDICINE

## 2021-02-22 ENCOUNTER — APPOINTMENT (OUTPATIENT)
Dept: CT IMAGING | Facility: HOSPITAL | Age: 41
End: 2021-02-22

## 2021-02-22 VITALS
RESPIRATION RATE: 16 BRPM | DIASTOLIC BLOOD PRESSURE: 72 MMHG | OXYGEN SATURATION: 94 % | TEMPERATURE: 99.8 F | SYSTOLIC BLOOD PRESSURE: 132 MMHG | HEIGHT: 65 IN | BODY MASS INDEX: 32.45 KG/M2 | HEART RATE: 95 BPM

## 2021-02-22 DIAGNOSIS — R11.2 NON-INTRACTABLE VOMITING WITH NAUSEA, UNSPECIFIED VOMITING TYPE: Primary | ICD-10-CM

## 2021-02-22 DIAGNOSIS — R10.10 UPPER ABDOMINAL PAIN: ICD-10-CM

## 2021-02-22 DIAGNOSIS — R51.9 FRONTAL HEADACHE: ICD-10-CM

## 2021-02-22 DIAGNOSIS — E86.0 DEHYDRATION: ICD-10-CM

## 2021-02-22 LAB
ALBUMIN SERPL-MCNC: 4.5 G/DL (ref 3.5–5.2)
ALBUMIN/GLOB SERPL: 1.8 G/DL
ALP SERPL-CCNC: 151 U/L (ref 39–117)
ALT SERPL W P-5'-P-CCNC: 79 U/L (ref 1–33)
ANION GAP SERPL CALCULATED.3IONS-SCNC: 10.2 MMOL/L (ref 5–15)
AST SERPL-CCNC: 51 U/L (ref 1–32)
BACTERIA UR QL AUTO: ABNORMAL /HPF
BASOPHILS # BLD AUTO: 0.02 10*3/MM3 (ref 0–0.2)
BASOPHILS NFR BLD AUTO: 0.3 % (ref 0–1.5)
BILIRUB SERPL-MCNC: 0.2 MG/DL (ref 0–1.2)
BILIRUB UR QL STRIP: NEGATIVE
BUN SERPL-MCNC: 10 MG/DL (ref 6–20)
BUN/CREAT SERPL: 18.2 (ref 7–25)
CALCIUM SPEC-SCNC: 9.6 MG/DL (ref 8.6–10.5)
CHLORIDE SERPL-SCNC: 102 MMOL/L (ref 98–107)
CLARITY UR: CLEAR
CO2 SERPL-SCNC: 27.8 MMOL/L (ref 22–29)
COLOR UR: YELLOW
CREAT SERPL-MCNC: 0.55 MG/DL (ref 0.57–1)
DEPRECATED RDW RBC AUTO: 42.9 FL (ref 37–54)
EOSINOPHIL # BLD AUTO: 0 10*3/MM3 (ref 0–0.4)
EOSINOPHIL NFR BLD AUTO: 0 % (ref 0.3–6.2)
ERYTHROCYTE [DISTWIDTH] IN BLOOD BY AUTOMATED COUNT: 13.1 % (ref 12.3–15.4)
GFR SERPL CREATININE-BSD FRML MDRD: 123 ML/MIN/1.73
GLOBULIN UR ELPH-MCNC: 2.5 GM/DL
GLUCOSE SERPL-MCNC: 109 MG/DL (ref 65–99)
GLUCOSE UR STRIP-MCNC: NEGATIVE MG/DL
HCT VFR BLD AUTO: 40.2 % (ref 34–46.6)
HGB BLD-MCNC: 13.4 G/DL (ref 12–15.9)
HGB UR QL STRIP.AUTO: NEGATIVE
HYALINE CASTS UR QL AUTO: ABNORMAL /LPF
IMM GRANULOCYTES # BLD AUTO: 0.02 10*3/MM3 (ref 0–0.05)
IMM GRANULOCYTES NFR BLD AUTO: 0.3 % (ref 0–0.5)
KETONES UR QL STRIP: ABNORMAL
LEUKOCYTE ESTERASE UR QL STRIP.AUTO: ABNORMAL
LIPASE SERPL-CCNC: 15 U/L (ref 13–60)
LYMPHOCYTES # BLD AUTO: 1.21 10*3/MM3 (ref 0.7–3.1)
LYMPHOCYTES NFR BLD AUTO: 16.4 % (ref 19.6–45.3)
MCH RBC QN AUTO: 30.2 PG (ref 26.6–33)
MCHC RBC AUTO-ENTMCNC: 33.3 G/DL (ref 31.5–35.7)
MCV RBC AUTO: 90.5 FL (ref 79–97)
MONOCYTES # BLD AUTO: 0.47 10*3/MM3 (ref 0.1–0.9)
MONOCYTES NFR BLD AUTO: 6.4 % (ref 5–12)
NEUTROPHILS NFR BLD AUTO: 5.67 10*3/MM3 (ref 1.7–7)
NEUTROPHILS NFR BLD AUTO: 76.6 % (ref 42.7–76)
NITRITE UR QL STRIP: NEGATIVE
NRBC BLD AUTO-RTO: 0 /100 WBC (ref 0–0.2)
PH UR STRIP.AUTO: >=9 [PH] (ref 5–8)
PLATELET # BLD AUTO: 238 10*3/MM3 (ref 140–450)
PMV BLD AUTO: 8.6 FL (ref 6–12)
POTASSIUM SERPL-SCNC: 4.2 MMOL/L (ref 3.5–5.2)
PROT SERPL-MCNC: 7 G/DL (ref 6–8.5)
PROT UR QL STRIP: NEGATIVE
RBC # BLD AUTO: 4.44 10*6/MM3 (ref 3.77–5.28)
RBC # UR: ABNORMAL /HPF
REF LAB TEST METHOD: ABNORMAL
SODIUM SERPL-SCNC: 140 MMOL/L (ref 136–145)
SP GR UR STRIP: >=1.03 (ref 1–1.03)
SQUAMOUS #/AREA URNS HPF: ABNORMAL /HPF
UROBILINOGEN UR QL STRIP: ABNORMAL
WBC # BLD AUTO: 7.39 10*3/MM3 (ref 3.4–10.8)
WBC UR QL AUTO: ABNORMAL /HPF

## 2021-02-22 PROCEDURE — 25010000002 IOPAMIDOL 61 % SOLUTION: Performed by: EMERGENCY MEDICINE

## 2021-02-22 PROCEDURE — 80053 COMPREHEN METABOLIC PANEL: CPT | Performed by: EMERGENCY MEDICINE

## 2021-02-22 PROCEDURE — 70450 CT HEAD/BRAIN W/O DYE: CPT

## 2021-02-22 PROCEDURE — 25010000002 PROCHLORPERAZINE 10 MG/2ML SOLUTION: Performed by: EMERGENCY MEDICINE

## 2021-02-22 PROCEDURE — 81001 URINALYSIS AUTO W/SCOPE: CPT | Performed by: EMERGENCY MEDICINE

## 2021-02-22 PROCEDURE — 74177 CT ABD & PELVIS W/CONTRAST: CPT

## 2021-02-22 PROCEDURE — 71045 X-RAY EXAM CHEST 1 VIEW: CPT

## 2021-02-22 PROCEDURE — 96375 TX/PRO/DX INJ NEW DRUG ADDON: CPT

## 2021-02-22 PROCEDURE — 25010000002 DIPHENHYDRAMINE PER 50 MG: Performed by: EMERGENCY MEDICINE

## 2021-02-22 PROCEDURE — 96374 THER/PROPH/DIAG INJ IV PUSH: CPT

## 2021-02-22 PROCEDURE — 25010000002 HYDROMORPHONE PER 4 MG: Performed by: EMERGENCY MEDICINE

## 2021-02-22 PROCEDURE — 99284 EMERGENCY DEPT VISIT MOD MDM: CPT

## 2021-02-22 PROCEDURE — 83690 ASSAY OF LIPASE: CPT | Performed by: EMERGENCY MEDICINE

## 2021-02-22 PROCEDURE — 96376 TX/PRO/DX INJ SAME DRUG ADON: CPT

## 2021-02-22 PROCEDURE — 85025 COMPLETE CBC W/AUTO DIFF WBC: CPT | Performed by: EMERGENCY MEDICINE

## 2021-02-22 PROCEDURE — 25010000002 ONDANSETRON PER 1 MG: Performed by: EMERGENCY MEDICINE

## 2021-02-22 RX ORDER — PROMETHAZINE HYDROCHLORIDE 25 MG/1
25 TABLET ORAL EVERY 6 HOURS PRN
Qty: 15 TABLET | Refills: 0 | Status: SHIPPED | OUTPATIENT
Start: 2021-02-22 | End: 2021-10-12

## 2021-02-22 RX ORDER — HYDROMORPHONE HYDROCHLORIDE 1 MG/ML
0.5 INJECTION, SOLUTION INTRAMUSCULAR; INTRAVENOUS; SUBCUTANEOUS ONCE
Status: COMPLETED | OUTPATIENT
Start: 2021-02-22 | End: 2021-02-22

## 2021-02-22 RX ORDER — DIPHENHYDRAMINE HYDROCHLORIDE 50 MG/ML
25 INJECTION INTRAMUSCULAR; INTRAVENOUS ONCE
Status: COMPLETED | OUTPATIENT
Start: 2021-02-22 | End: 2021-02-22

## 2021-02-22 RX ORDER — PROCHLORPERAZINE EDISYLATE 5 MG/ML
10 INJECTION INTRAMUSCULAR; INTRAVENOUS ONCE
Status: COMPLETED | OUTPATIENT
Start: 2021-02-22 | End: 2021-02-22

## 2021-02-22 RX ORDER — FAMOTIDINE 10 MG/ML
20 INJECTION, SOLUTION INTRAVENOUS ONCE
Status: COMPLETED | OUTPATIENT
Start: 2021-02-22 | End: 2021-02-22

## 2021-02-22 RX ORDER — ONDANSETRON 2 MG/ML
4 INJECTION INTRAMUSCULAR; INTRAVENOUS ONCE
Status: COMPLETED | OUTPATIENT
Start: 2021-02-22 | End: 2021-02-22

## 2021-02-22 RX ORDER — SODIUM CHLORIDE 9 MG/ML
125 INJECTION, SOLUTION INTRAVENOUS CONTINUOUS
Status: DISCONTINUED | OUTPATIENT
Start: 2021-02-22 | End: 2021-02-22 | Stop reason: HOSPADM

## 2021-02-22 RX ADMIN — DIPHENHYDRAMINE HYDROCHLORIDE 25 MG: 50 INJECTION, SOLUTION INTRAMUSCULAR; INTRAVENOUS at 12:45

## 2021-02-22 RX ADMIN — SODIUM CHLORIDE 1000 ML: 9 INJECTION, SOLUTION INTRAVENOUS at 13:51

## 2021-02-22 RX ADMIN — SODIUM CHLORIDE 1000 ML: 9 INJECTION, SOLUTION INTRAVENOUS at 11:07

## 2021-02-22 RX ADMIN — ONDANSETRON 4 MG: 2 INJECTION INTRAMUSCULAR; INTRAVENOUS at 11:16

## 2021-02-22 RX ADMIN — PROCHLORPERAZINE EDISYLATE 10 MG: 5 INJECTION INTRAMUSCULAR; INTRAVENOUS at 12:42

## 2021-02-22 RX ADMIN — HYDROMORPHONE HYDROCHLORIDE 0.5 MG: 1 INJECTION, SOLUTION INTRAMUSCULAR; INTRAVENOUS; SUBCUTANEOUS at 13:54

## 2021-02-22 RX ADMIN — FAMOTIDINE 20 MG: 10 INJECTION INTRAVENOUS at 11:18

## 2021-02-22 RX ADMIN — IOPAMIDOL 100 ML: 612 INJECTION, SOLUTION INTRAVENOUS at 11:52

## 2021-02-22 RX ADMIN — ONDANSETRON 4 MG: 2 INJECTION INTRAMUSCULAR; INTRAVENOUS at 13:53

## 2021-02-22 NOTE — DISCHARGE INSTRUCTIONS
Go home and rest today.  Clear liquid diet for the next 24 hours.  Follow-up with your doctor if not better in 2 days.  Return to the emergency room for fevers, chills, worsening symptoms or intractable vomiting.

## 2021-02-22 NOTE — ED PROVIDER NOTES
EMERGENCY DEPARTMENT ENCOUNTER    Room Number:    Date of encounter:  2021  PCP: Provider, No Known  Historian: Patient      HPI:  Chief Complaint: Vomiting      Context: Evangelina Mtz is a 41 y.o. female who presents to the ED c/o acute onset of vomiting approximately 10 hours ago.  The patient states she has a history of psoriatic arthritis and was supposed to receive her Humira last week but was unable to do so due to the weather.  She states that last night she began projectile vomiting and has had abdominal cramping but no diarrhea, fevers, chills, shortness of breath, chest pain, headache, leg pain or focal neuro deficit.  The patient states that 2 nights ago she had a cough that is now resolved.  The patient states that she becomes dehydrated very quickly and feels like she is dehydrated now.      PAST MEDICAL HISTORY  Active Ambulatory Problems     Diagnosis Date Noted   • Intractable vomiting with nausea 2018   • Abdominal pain 2018   • Influenza 2018   • Decreased urination 2018   • LFT elevation 2018     Resolved Ambulatory Problems     Diagnosis Date Noted   • No Resolved Ambulatory Problems     Past Medical History:   Diagnosis Date   • Anxiety    • C. difficile colitis    • Chronic pain syndrome    • Depression    • Fatigue    • Fibromyalgia    • Insomnia    • Lupus (CMS/HCC)    • ELIZABETH (obstructive sleep apnea)    • PCOS (polycystic ovarian syndrome)    • Psoriatic arthritis (CMS/HCC)          PAST SURGICAL HISTORY  Past Surgical History:   Procedure Laterality Date   •  SECTION N/A    • INCISION AND DRAINAGE TRUNK N/A 2019    In-office Procedure: Incision and drainage of thrombosed hemorrhoid-Dr. Diane Frausto, [facility]   • LAPAROSCOPIC CHOLECYSTECTOMY N/A 2007    Dr. Jad Powers, [facility]   • TONSILLECTOMY Bilateral    • TOTAL ABDOMINAL HYSTERECTOMY WITH SALPINGO OOPHORECTOMY Bilateral    • TYMPANOPLASTY Bilateral          FAMILY  HISTORY  Family History   Adopted: Yes         SOCIAL HISTORY  Social History     Socioeconomic History   • Marital status:      Spouse name: Not on file   • Number of children: Not on file   • Years of education: Not on file   • Highest education level: Not on file   Tobacco Use   • Smoking status: Never Smoker   • Smokeless tobacco: Never Used   Substance and Sexual Activity   • Alcohol use: No   • Drug use: No   • Sexual activity: Defer         ALLERGIES  Amoxicillin, Ceclor [cefaclor], Codeine, Eggs or egg-derived products, Penicillins, Rocephin [ceftriaxone], and Sulfa antibiotics        REVIEW OF SYSTEMS  Review of Systems         All systems reviewed and negative except for those discussed in HPI.     PHYSICAL EXAM    I have reviewed the triage vital signs and nursing notes.    ED Triage Vitals [02/22/21 1002]   Temp Heart Rate Resp BP SpO2   99.8 °F (37.7 °C) (!) 129 18 -- 94 %      Temp src Heart Rate Source Patient Position BP Location FiO2 (%)   -- -- -- -- --       GENERAL: 41-year-old well developed, well nourished in mild distress  HENT: NCAT, neck supple, trachea midline  EYES: no scleral icterus, PERRL, normal conjunctiva  CV: regular rhythm, tachycardic to 110, no murmur  RESPIRATORY: unlabored effort, CTAB  ABDOMEN: soft, mild epigastric tenderness with no guarding or rebound, non-distended, bowel sounds diminished  MUSCULOSKELETAL: no gross deformity, no pedal edema, no calf tenderness  NEURO: alert,  sensory and motor function of extremities grossly intact, speech clear, mental status normal  SKIN: warm, dry, no rash  PSYCH:  Appropriate mood and affect      PPE  Pt does not present with symptoms for COVID19; however, I was wearing a mask and goggles throughout all patient interaction.    Vital signs and nursing notes reviewed.      LAB RESULTS  Recent Results (from the past 24 hour(s))   Comprehensive Metabolic Panel    Collection Time: 02/22/21 11:04 AM    Specimen: Blood   Result Value  Ref Range    Glucose 109 (H) 65 - 99 mg/dL    BUN 10 6 - 20 mg/dL    Creatinine 0.55 (L) 0.57 - 1.00 mg/dL    Sodium 140 136 - 145 mmol/L    Potassium 4.2 3.5 - 5.2 mmol/L    Chloride 102 98 - 107 mmol/L    CO2 27.8 22.0 - 29.0 mmol/L    Calcium 9.6 8.6 - 10.5 mg/dL    Total Protein 7.0 6.0 - 8.5 g/dL    Albumin 4.50 3.50 - 5.20 g/dL    ALT (SGPT) 79 (H) 1 - 33 U/L    AST (SGOT) 51 (H) 1 - 32 U/L    Alkaline Phosphatase 151 (H) 39 - 117 U/L    Total Bilirubin 0.2 0.0 - 1.2 mg/dL    eGFR Non African Amer 123 >60 mL/min/1.73    Globulin 2.5 gm/dL    A/G Ratio 1.8 g/dL    BUN/Creatinine Ratio 18.2 7.0 - 25.0    Anion Gap 10.2 5.0 - 15.0 mmol/L   Lipase    Collection Time: 02/22/21 11:04 AM    Specimen: Blood   Result Value Ref Range    Lipase 15 13 - 60 U/L   CBC Auto Differential    Collection Time: 02/22/21 11:04 AM    Specimen: Blood   Result Value Ref Range    WBC 7.39 3.40 - 10.80 10*3/mm3    RBC 4.44 3.77 - 5.28 10*6/mm3    Hemoglobin 13.4 12.0 - 15.9 g/dL    Hematocrit 40.2 34.0 - 46.6 %    MCV 90.5 79.0 - 97.0 fL    MCH 30.2 26.6 - 33.0 pg    MCHC 33.3 31.5 - 35.7 g/dL    RDW 13.1 12.3 - 15.4 %    RDW-SD 42.9 37.0 - 54.0 fl    MPV 8.6 6.0 - 12.0 fL    Platelets 238 140 - 450 10*3/mm3    Neutrophil % 76.6 (H) 42.7 - 76.0 %    Lymphocyte % 16.4 (L) 19.6 - 45.3 %    Monocyte % 6.4 5.0 - 12.0 %    Eosinophil % 0.0 (L) 0.3 - 6.2 %    Basophil % 0.3 0.0 - 1.5 %    Immature Grans % 0.3 0.0 - 0.5 %    Neutrophils, Absolute 5.67 1.70 - 7.00 10*3/mm3    Lymphocytes, Absolute 1.21 0.70 - 3.10 10*3/mm3    Monocytes, Absolute 0.47 0.10 - 0.90 10*3/mm3    Eosinophils, Absolute 0.00 0.00 - 0.40 10*3/mm3    Basophils, Absolute 0.02 0.00 - 0.20 10*3/mm3    Immature Grans, Absolute 0.02 0.00 - 0.05 10*3/mm3    nRBC 0.0 0.0 - 0.2 /100 WBC   Urinalysis With Microscopic If Indicated (No Culture) - Urine, Clean Catch    Collection Time: 02/22/21 12:37 PM    Specimen: Urine, Clean Catch   Result Value Ref Range    Color, UA Yellow  Yellow, Straw    Appearance, UA Clear Clear    pH, UA >=9.0 (H) 5.0 - 8.0    Specific Gravity, UA >=1.030 1.005 - 1.030    Glucose, UA Negative Negative    Ketones, UA 15 mg/dL (1+) (A) Negative    Bilirubin, UA Negative Negative    Blood, UA Negative Negative    Protein, UA Negative Negative    Leuk Esterase, UA Small (1+) (A) Negative    Nitrite, UA Negative Negative    Urobilinogen, UA 0.2 E.U./dL 0.2 - 1.0 E.U./dL   Urinalysis, Microscopic Only - Urine, Clean Catch    Collection Time: 02/22/21 12:37 PM    Specimen: Urine, Clean Catch   Result Value Ref Range    RBC, UA 0-2 None Seen, 0-2 /HPF    WBC, UA 13-20 (A) None Seen, 0-2 /HPF    Bacteria, UA None Seen None Seen /HPF    Squamous Epithelial Cells, UA 0-2 None Seen, 0-2 /HPF    Hyaline Casts, UA None Seen None Seen /LPF    Methodology Automated Microscopy        Ordered the above labs and independently reviewed the results.        RADIOLOGY  Ct Abdomen Pelvis With Contrast    Result Date: 2/22/2021  CT ABDOMEN AND PELVIS WITH IV CONTRAST  HISTORY: 41-year-old female with abdominal pain and vomiting.  TECHNIQUE: Radiation dose reduction techniques were utilized, including automated exposure control and exposure modulation based on body size. 3 mm images were obtained through the abdomen and pelvis after the administration of IV contrast. Compared with previous CT from 11/01/2017.  FINDINGS: There is a moderate degree of fatty infiltration of the liver which appears largely unchanged. The gallbladder is surgically absent and there is no biliary dilatation. The spleen, pancreas, adrenals, and kidneys appear unremarkable. No acute bowel abnormality is seen. The appendix is either surgically absent or diminutive. The uterus and ovaries are surgically absent. There is no free fluid or lymphadenopathy. There are no significant abdominal aortic atherosclerotic changes.      No acute abnormality is seen. Please follow-up as clinically indicated.  Discussed with   [place].      Xr Chest 1 View    Result Date: 2/22/2021  PORTABLE CHEST X-RAY  HISTORY: Cough. Nausea and vomiting.  Portable chest x-ray is provided. Correlation: Chest x-ray May 17, 2019.   FINDINGS: The cardiomediastinal silhouette is normal. The lungs are clear. The costophrenic sulci are dry and the bones appear normal. There is no pneumothorax.      Negative.  This report was finalized on 2/22/2021 11:00 AM by Dr. Pino Bynum M.D.        I ordered the above noted radiological studies. Independently reviewed by me and discussed with radiologist.  See dictation above for official radiology interpretation.      PROCEDURES    Procedures        MEDICATIONS GIVEN IN ER    Medications   sodium chloride 0.9 % infusion (has no administration in time range)   sodium chloride 0.9 % bolus 1,000 mL (0 mL Intravenous Stopped 2/22/21 1340)   ondansetron (ZOFRAN) injection 4 mg (4 mg Intravenous Given 2/22/21 1116)   famotidine (PEPCID) injection 20 mg (20 mg Intravenous Given 2/22/21 1118)   iopamidol (ISOVUE-300) 61 % injection 100 mL (100 mL Intravenous Given by Other 2/22/21 1152)   prochlorperazine (COMPAZINE) injection 10 mg (10 mg Intravenous Given 2/22/21 1242)   diphenhydrAMINE (BENADRYL) injection 25 mg (25 mg Intravenous Given 2/22/21 1245)   sodium chloride 0.9 % bolus 1,000 mL (1,000 mL Intravenous New Bag 2/22/21 1351)   HYDROmorphone (DILAUDID) injection 0.5 mg (0.5 mg Intravenous Given 2/22/21 1354)   ondansetron (ZOFRAN) injection 4 mg (4 mg Intravenous Given 2/22/21 1353)         PROGRESS, DATA ANALYSIS, CONSULTS, AND MEDICAL DECISION MAKING    All labs have been independently reviewed by me.  All radiology studies have been reviewed by me and discussed with radiologist dictating report.   EKG's independently reviewed by me.  Discussion below represents my analysis of pertinent findings related to patient's condition, differential diagnosis, treatment plan and final disposition.      ED Course as of Feb  22 1527   Mon Feb 22, 2021   1205 The patient's white count is normal however her LFTs are mildly elevated with a normal lipase.  I am currently awaiting the results of her CT scan.  She states that the Zofran and Pepcid is not helped her symptoms and that now she has a headache.  I will treat her with Compazine and Benadryl while awaiting the CT scan official reading.    [GP]   1323 The patient's chest x-ray and CT abdomen pelvis are negative acute.  She has mildly elevated LFTs and 1+ ketones in her urine.  I will continue with IV hydration and reevaluate the patient to see how the Compazine has helped her symptoms.    [GP]   1338 The patient now states she feels better but now is complaining of a headache.  She states she is not having neck pain or pain with movement of her neck.  Now the patient states she would like to have meningitis ruled out.  I told her we will start with a head CT and I will give her another dose of pain medicine and nausea medicine and see how she is doing.    [GP]   1449 Patient now feels much better.  She is headed to CT.  I discussed with her even if the CT is negative that we would need to do a spinal tap to rule out meningitis.  She states she feels much better now and does not feel she needs a spinal tap at this point.  I advised her that if her symptoms get worse she can always return to have a spinal tap done at that time if indicated.    [GP]   1524 I discussed the patient's head CT with Dr. Fitzgerald from radiology.  He states the patient has a mucous retention cyst but otherwise her head CT is normal.    [GP]   1526 Upon repeat examination I advised the patient that her head CT is negative, her chest x-ray is negative, abdominal CT is negative, her white count is normal but her LFTs are mildly elevated.  The patient states that her LFTs always run mildly elevated.  She states she feels much better, her headache and vomiting have resolved and is ready for discharge now    [GP]       ED Course User Index  [GP] Amador Guardado MD           The differential diagnosis includes but is not limited to gastritis, pancreatitis, cholecystitis, appendicitis, diverticulitis, urinary tract infection, kidney stone, or bowel obstruction.          AS OF 15:27 EST VITALS:    BP - 129/74  HR - 102  TEMP - 99.8 °F (37.7 °C)  02 SATS - 94%        DIAGNOSIS  Final diagnoses:   Non-intractable vomiting with nausea, unspecified vomiting type   Upper abdominal pain   Frontal headache   Dehydration         DISPOSITION  DISCHARGE    Patient discharged in stable condition.    Reviewed implications of results, diagnosis, meds, responsibility to follow up, warning signs and symptoms of possible worsening, potential complications and reasons to return to ER, including worsening pain, intractable vomiting or fever.    Patient/Family voiced understanding of above instructions.    Discussed plan for discharge, as there is no emergent indication for admission.  Pt/family is agreeable and understands need for follow up and repeat testing.  Pt is aware that discharge does not mean that nothing is wrong but it indicates no emergency is present and they must continue care with follow-up as given below or physician of their choice.     FOLLOW-UP  PATIENT CONNECTION - [facility] [id number]  [phone]  In 2 days  If symptoms worsen              EMR Dragon/Transcription disclaimer:   Much of this encounter note is an electronic transcription/translation of spoken language to printed text.        Amador Guardado MD  02/22/21 0076

## 2021-02-22 NOTE — ED NOTES
Pt from home with complaints of nausea/ vomiting and shaking that started at midnight.      Job, Josiane, RN  02/22/21 1247

## 2021-10-12 ENCOUNTER — OFFICE VISIT (OUTPATIENT)
Dept: SURGERY | Facility: CLINIC | Age: 41
End: 2021-10-12

## 2021-10-12 VITALS — HEIGHT: 64 IN | WEIGHT: 206.4 LBS | BODY MASS INDEX: 35.24 KG/M2

## 2021-10-12 DIAGNOSIS — K64.5 THROMBOSED EXTERNAL HEMORRHOID: Primary | ICD-10-CM

## 2021-10-12 PROCEDURE — 46083 INC THROMBOSED HROID XTRNL: CPT | Performed by: SURGERY

## 2021-10-12 RX ORDER — PREGABALIN 75 MG/1
75 CAPSULE ORAL 2 TIMES DAILY
COMMUNITY

## 2021-10-12 NOTE — PROGRESS NOTES
General Surgery  Established Patient Office Visit    CC: External hemorrhoid    HPI: The patient is a pleasant 41 y.o. year-old lady who returns to see me today for a recurrent external hemorrhoid that popped up about 2 weeks ago.  She was at the movie theater with her family when she felt a sensation of pain and a palpable bulge outside of her anus.  It has been present in a constant duration for the last 2 weeks with no real change in its size.  She has had persistent ongoing pain that seems to be worse with palpation.  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  She denies any recent constipation or blood in her stool.  She had a bowel movement this morning.  She called my office as I previously performed an incision and drainage on a thrombosed external hemorrhoid about 2 years ago.     Past Medical History:   Depression  Anxiety  PCOS  Obstructive sleep apnea  Fibromyalgia  History of C. difficile colitis    Past Surgical History:   Total abdominal hysterectomy with bilateral salpingo-oophorectomy   section  Laparoscopic cholecystectomy  Tonsillectomy  Tympanoplasty    Medications:   Tylenol as needed for pain  Lyrica once daily    Allergies: Amoxicillin, Ceclor, codeine, egg products, penicillins, ceftriaxone, sulfa antibiotics    Family History: No family history of gastrointestinal malignancy in her mother or father    Social History: , works for [employer], non-smoker, no regular alcohol use    ROS:   Constitutional: Negative for fevers or chills  HENT: Negative for hearing loss or runny nose  Eyes: Negative for vision changes or scleral icterus  Respiratory: Negative for cough or shortness of breath  Cardiovascular: Negative for chest pain or heart palpitations  Gastrointestinal: Positive for rectal and anal pain; negative for abdominal pain, nausea, vomiting, constipation, melena, or hematochezia  Genitourinary: Negative  for hematuria or dysuria  Musculoskeletal: Negative for joint pain or back pain  Neurologic: Negative for headaches or dizziness  Psychiatric: Negative for anxiety or depression  All other systems reviewed and negative    Physical Exam:  Height: 162 cm  Weight: 93 kg  BMI: 35.4  General: No acute distress, well-nourished & well-developed  HEAD: normocephalic, atraumatic  EYES: normal conjunctiva, sclera anicteric  EARS: grossly normal hearing  NECK: supple, no thyromegaly  CARDIOVASCULAR: regular rate and rhythm  RESPIRATORY: clear to auscultation bilaterally  GASTROINTESTINAL: soft, nontender, non-distended  MUSCULOSKELETAL: normal gait and station. No gross extremity abnormalities  PSYCHIATRIC: oriented x3, normal mood and affect  RECTUM: Small thrombosed external hemorrhoid that is tender to palpation along the left anal sidewall    Procedure Note:  Pre-Operative Diagnosis: Thrombosed external hemorrhoid  Post-Operative Diagnosis: Same  Procedure performed: Incision and drainage of thrombosed external hemorrhoid  Surgeon: Diane Frausto MD  Assistant: None  Anesthesia: Local (1% Lidocaine with epinephrine)  Complications: None  EBL: Minimal  Specimens: None  Description of Procedure: The patient was brought to the minor procedure room and laid in the left lateral decubitus position on the examining table.  Her perianal skin was prepped and draped sterilely using Hibiclens soap and a surgical timeout completed.  The thrombosed external hemorrhoid was anesthetized using 1% lidocaine with epinephrine and incised sharply.  There was a small amount of clot beneath the skin that was easily evacuated, completely decompressing the thrombosed hemorrhoid.  She tolerated the procedure well.      ASSESSMENT & PLAN  Mrs. Mtz is a 41-year-old lady with a thrombosed external hemorrhoid for which I performed an incision and drainage today in the office.  She tolerated this well and can follow-up with me as needed.  I  advised her she will likely have some spontaneous bleeding from the incision for the next 24 to 48 hours and should keep a pad in her underwear.    Diane Frausto MD  General and Endoscopic Surgery  [facility]    [address], [address]  [place], [place], [address]  P: [phone]  F: [phone]